# Patient Record
Sex: FEMALE | Race: BLACK OR AFRICAN AMERICAN | Employment: OTHER | URBAN - METROPOLITAN AREA
[De-identification: names, ages, dates, MRNs, and addresses within clinical notes are randomized per-mention and may not be internally consistent; named-entity substitution may affect disease eponyms.]

---

## 2017-05-09 ENCOUNTER — HOSPITAL ENCOUNTER (OUTPATIENT)
Dept: MAMMOGRAPHY | Age: 62
Discharge: HOME | End: 2017-05-09
Attending: OBSTETRICS & GYNECOLOGY
Payer: COMMERCIAL

## 2017-05-09 DIAGNOSIS — Z85.3 PERSONAL HISTORY OF MALIGNANT NEOPLASM OF BREAST: ICD-10-CM

## 2017-05-09 PROCEDURE — 76642 ULTRASOUND BREAST LIMITED: CPT

## 2017-05-09 PROCEDURE — 77065 DX MAMMO INCL CAD UNI: CPT

## 2017-08-09 ENCOUNTER — HOSPITAL ENCOUNTER (OUTPATIENT)
Dept: MAMMOGRAPHY | Age: 62
Discharge: HOME | End: 2017-08-09
Attending: OBSTETRICS & GYNECOLOGY
Payer: COMMERCIAL

## 2017-08-09 DIAGNOSIS — Z12.31 VISIT FOR SCREENING MAMMOGRAM: ICD-10-CM

## 2017-08-09 PROCEDURE — 77067 SCR MAMMO BI INCL CAD: CPT

## 2018-08-11 ENCOUNTER — HOSPITAL ENCOUNTER (OUTPATIENT)
Dept: MAMMOGRAPHY | Age: 63
Discharge: HOME | End: 2018-08-11
Attending: OBSTETRICS & GYNECOLOGY
Payer: COMMERCIAL

## 2018-08-11 DIAGNOSIS — Z12.31 VISIT FOR SCREENING MAMMOGRAM: ICD-10-CM

## 2018-08-11 PROCEDURE — 77063 BREAST TOMOSYNTHESIS BI: CPT

## 2019-02-06 ENCOUNTER — HOSPITAL ENCOUNTER (OUTPATIENT)
Dept: MRI IMAGING | Age: 64
Discharge: HOME | End: 2019-02-06
Attending: PHYSICAL MEDICINE & REHABILITATION
Payer: COMMERCIAL

## 2019-02-06 ENCOUNTER — HOSPITAL ENCOUNTER (OUTPATIENT)
Dept: GENERAL RADIOLOGY | Age: 64
Discharge: HOME | End: 2019-02-06
Attending: PHYSICAL MEDICINE & REHABILITATION
Payer: COMMERCIAL

## 2019-02-06 DIAGNOSIS — M54.17 LUMBOSACRAL NEURITIS: ICD-10-CM

## 2019-02-06 DIAGNOSIS — M54.17 LUMBOSACRAL RADICULOPATHY: ICD-10-CM

## 2019-02-06 PROCEDURE — 72148 MRI LUMBAR SPINE W/O DYE: CPT

## 2019-02-06 PROCEDURE — 72110 X-RAY EXAM L-2 SPINE 4/>VWS: CPT

## 2019-02-11 ENCOUNTER — HOSPITAL ENCOUNTER (INPATIENT)
Age: 64
LOS: 14 days | Discharge: HOME HEALTH | DRG: 477 | End: 2019-02-25
Attending: EMERGENCY MEDICINE | Admitting: INTERNAL MEDICINE
Payer: COMMERCIAL

## 2019-02-11 DIAGNOSIS — G06.2 EPIDURAL ABSCESS: ICD-10-CM

## 2019-02-11 DIAGNOSIS — M86.08 ACUTE HEMATOGENOUS OSTEOMYELITIS OF OTHER SITE (HCC): Primary | ICD-10-CM

## 2019-02-11 LAB
ALBUMIN SERPL-MCNC: 3.8 G/DL (ref 3.5–5)
ALBUMIN/GLOB SERPL: 0.8 {RATIO} (ref 1.1–2.2)
ALP SERPL-CCNC: 150 U/L (ref 45–117)
ALT SERPL-CCNC: 29 U/L (ref 12–78)
ANION GAP SERPL CALC-SCNC: 6 MMOL/L (ref 5–15)
AST SERPL-CCNC: 30 U/L (ref 15–37)
BASOPHILS # BLD: 0 K/UL (ref 0–0.1)
BASOPHILS NFR BLD: 0 % (ref 0–1)
BILIRUB SERPL-MCNC: 0.3 MG/DL (ref 0.2–1)
BUN SERPL-MCNC: 17 MG/DL (ref 6–20)
BUN/CREAT SERPL: 20 (ref 12–20)
CALCIUM SERPL-MCNC: 8.7 MG/DL (ref 8.5–10.1)
CHLORIDE SERPL-SCNC: 105 MMOL/L (ref 97–108)
CO2 SERPL-SCNC: 29 MMOL/L (ref 21–32)
COMMENT, HOLDF: NORMAL
CREAT SERPL-MCNC: 0.85 MG/DL (ref 0.55–1.02)
DIFFERENTIAL METHOD BLD: ABNORMAL
EOSINOPHIL # BLD: 0 K/UL (ref 0–0.4)
EOSINOPHIL NFR BLD: 1 % (ref 0–7)
ERYTHROCYTE [DISTWIDTH] IN BLOOD BY AUTOMATED COUNT: 14.6 % (ref 11.5–14.5)
GLOBULIN SER CALC-MCNC: 5 G/DL (ref 2–4)
GLUCOSE SERPL-MCNC: 103 MG/DL (ref 65–100)
HCT VFR BLD AUTO: 36.5 % (ref 35–47)
HGB BLD-MCNC: 12 G/DL (ref 11.5–16)
IMM GRANULOCYTES # BLD AUTO: 0 K/UL (ref 0–0.04)
IMM GRANULOCYTES NFR BLD AUTO: 0 % (ref 0–0.5)
LYMPHOCYTES # BLD: 2.2 K/UL (ref 0.8–3.5)
LYMPHOCYTES NFR BLD: 35 % (ref 12–49)
MCH RBC QN AUTO: 29.9 PG (ref 26–34)
MCHC RBC AUTO-ENTMCNC: 32.9 G/DL (ref 30–36.5)
MCV RBC AUTO: 90.8 FL (ref 80–99)
MONOCYTES # BLD: 0.5 K/UL (ref 0–1)
MONOCYTES NFR BLD: 9 % (ref 5–13)
NEUTS SEG # BLD: 3.4 K/UL (ref 1.8–8)
NEUTS SEG NFR BLD: 55 % (ref 32–75)
NRBC # BLD: 0 K/UL (ref 0–0.01)
NRBC BLD-RTO: 0 PER 100 WBC
PLATELET # BLD AUTO: 204 K/UL (ref 150–400)
PMV BLD AUTO: 10.2 FL (ref 8.9–12.9)
POTASSIUM SERPL-SCNC: 3.3 MMOL/L (ref 3.5–5.1)
PROT SERPL-MCNC: 8.8 G/DL (ref 6.4–8.2)
RBC # BLD AUTO: 4.02 M/UL (ref 3.8–5.2)
SAMPLES BEING HELD,HOLD: NORMAL
SODIUM SERPL-SCNC: 140 MMOL/L (ref 136–145)
WBC # BLD AUTO: 6.1 K/UL (ref 3.6–11)

## 2019-02-11 PROCEDURE — 0QB03ZX EXCISION OF LUMBAR VERTEBRA, PERCUTANEOUS APPROACH, DIAGNOSTIC: ICD-10-PCS | Performed by: RADIOLOGY

## 2019-02-11 PROCEDURE — 87040 BLOOD CULTURE FOR BACTERIA: CPT

## 2019-02-11 PROCEDURE — 85025 COMPLETE CBC W/AUTO DIFF WBC: CPT

## 2019-02-11 PROCEDURE — 74011250637 HC RX REV CODE- 250/637: Performed by: INTERNAL MEDICINE

## 2019-02-11 PROCEDURE — 80053 COMPREHEN METABOLIC PANEL: CPT

## 2019-02-11 PROCEDURE — 74011000258 HC RX REV CODE- 258: Performed by: INTERNAL MEDICINE

## 2019-02-11 PROCEDURE — 99284 EMERGENCY DEPT VISIT MOD MDM: CPT

## 2019-02-11 PROCEDURE — 65270000032 HC RM SEMIPRIVATE

## 2019-02-11 PROCEDURE — 36415 COLL VENOUS BLD VENIPUNCTURE: CPT

## 2019-02-11 PROCEDURE — 74011250636 HC RX REV CODE- 250/636: Performed by: INTERNAL MEDICINE

## 2019-02-11 RX ORDER — LANOLIN ALCOHOL/MO/W.PET/CERES
1000 CREAM (GRAM) TOPICAL DAILY
COMMUNITY

## 2019-02-11 RX ORDER — FERROUS SULFATE, DRIED 160(50) MG
1 TABLET, EXTENDED RELEASE ORAL DAILY
Status: DISCONTINUED | OUTPATIENT
Start: 2019-02-12 | End: 2019-02-25 | Stop reason: HOSPADM

## 2019-02-11 RX ORDER — SODIUM CHLORIDE 9 MG/ML
75 INJECTION, SOLUTION INTRAVENOUS CONTINUOUS
Status: DISCONTINUED | OUTPATIENT
Start: 2019-02-11 | End: 2019-02-12

## 2019-02-11 RX ORDER — THERA TABS 400 MCG
1 TAB ORAL DAILY
COMMUNITY

## 2019-02-11 RX ORDER — ACETAMINOPHEN 500 MG
1000 TABLET ORAL
COMMUNITY

## 2019-02-11 RX ORDER — POTASSIUM CHLORIDE 750 MG/1
10 TABLET, FILM COATED, EXTENDED RELEASE ORAL DAILY
Status: DISCONTINUED | OUTPATIENT
Start: 2019-02-12 | End: 2019-02-24

## 2019-02-11 RX ORDER — THERA TABS 400 MCG
1 TAB ORAL DAILY
Status: DISCONTINUED | OUTPATIENT
Start: 2019-02-12 | End: 2019-02-25 | Stop reason: HOSPADM

## 2019-02-11 RX ORDER — CYCLOBENZAPRINE HCL 10 MG
5 TABLET ORAL
COMMUNITY

## 2019-02-11 RX ORDER — ACETAMINOPHEN 500 MG
1000 TABLET ORAL
Status: DISCONTINUED | OUTPATIENT
Start: 2019-02-11 | End: 2019-02-25 | Stop reason: HOSPADM

## 2019-02-11 RX ORDER — SODIUM CHLORIDE 0.9 % (FLUSH) 0.9 %
5-40 SYRINGE (ML) INJECTION AS NEEDED
Status: DISCONTINUED | OUTPATIENT
Start: 2019-02-11 | End: 2019-02-25 | Stop reason: HOSPADM

## 2019-02-11 RX ORDER — ATORVASTATIN CALCIUM 10 MG/1
10 TABLET, FILM COATED ORAL EVERY EVENING
Status: DISCONTINUED | OUTPATIENT
Start: 2019-02-11 | End: 2019-02-25 | Stop reason: HOSPADM

## 2019-02-11 RX ORDER — VENLAFAXINE HYDROCHLORIDE 37.5 MG/1
150 CAPSULE, EXTENDED RELEASE ORAL EVERY EVENING
Status: DISCONTINUED | OUTPATIENT
Start: 2019-02-11 | End: 2019-02-25 | Stop reason: HOSPADM

## 2019-02-11 RX ORDER — LANOLIN ALCOHOL/MO/W.PET/CERES
1000 CREAM (GRAM) TOPICAL DAILY
Status: DISCONTINUED | OUTPATIENT
Start: 2019-02-12 | End: 2019-02-25 | Stop reason: HOSPADM

## 2019-02-11 RX ORDER — MORPHINE SULFATE 2 MG/ML
2 INJECTION, SOLUTION INTRAMUSCULAR; INTRAVENOUS
Status: DISCONTINUED | OUTPATIENT
Start: 2019-02-11 | End: 2019-02-25 | Stop reason: HOSPADM

## 2019-02-11 RX ORDER — HYDROCODONE BITARTRATE AND ACETAMINOPHEN 5; 325 MG/1; MG/1
1 TABLET ORAL
Status: DISCONTINUED | OUTPATIENT
Start: 2019-02-11 | End: 2019-02-25 | Stop reason: HOSPADM

## 2019-02-11 RX ORDER — SODIUM CHLORIDE 0.9 % (FLUSH) 0.9 %
5-40 SYRINGE (ML) INJECTION EVERY 8 HOURS
Status: DISCONTINUED | OUTPATIENT
Start: 2019-02-11 | End: 2019-02-25 | Stop reason: HOSPADM

## 2019-02-11 RX ORDER — ONDANSETRON 2 MG/ML
4 INJECTION INTRAMUSCULAR; INTRAVENOUS
Status: DISCONTINUED | OUTPATIENT
Start: 2019-02-11 | End: 2019-02-25 | Stop reason: HOSPADM

## 2019-02-11 RX ORDER — VANCOMYCIN 2 GRAM/500 ML IN 0.9 % SODIUM CHLORIDE INTRAVENOUS
2000 ONCE
Status: COMPLETED | OUTPATIENT
Start: 2019-02-11 | End: 2019-02-11

## 2019-02-11 RX ORDER — CYCLOBENZAPRINE HCL 10 MG
5 TABLET ORAL
Status: DISCONTINUED | OUTPATIENT
Start: 2019-02-12 | End: 2019-02-25 | Stop reason: HOSPADM

## 2019-02-11 RX ORDER — ATORVASTATIN CALCIUM 10 MG/1
10 TABLET, FILM COATED ORAL EVERY EVENING
COMMUNITY

## 2019-02-11 RX ORDER — POTASSIUM CHLORIDE 1.5 G/1.77G
40 POWDER, FOR SOLUTION ORAL
Status: COMPLETED | OUTPATIENT
Start: 2019-02-11 | End: 2019-02-11

## 2019-02-11 RX ADMIN — POTASSIUM CHLORIDE 40 MEQ: 1.5 POWDER, FOR SOLUTION ORAL at 18:22

## 2019-02-11 RX ADMIN — SODIUM CHLORIDE 75 ML/HR: 900 INJECTION, SOLUTION INTRAVENOUS at 19:55

## 2019-02-11 RX ADMIN — PIPERACILLIN SODIUM,TAZOBACTAM SODIUM 3.38 G: 3; .375 INJECTION, POWDER, FOR SOLUTION INTRAVENOUS at 19:55

## 2019-02-11 RX ADMIN — Medication 10 ML: at 22:00

## 2019-02-11 RX ADMIN — VENLAFAXINE HYDROCHLORIDE 150 MG: 150 CAPSULE, EXTENDED RELEASE ORAL at 22:51

## 2019-02-11 RX ADMIN — VANCOMYCIN HYDROCHLORIDE 2000 MG: 10 INJECTION, POWDER, LYOPHILIZED, FOR SOLUTION INTRAVENOUS at 20:34

## 2019-02-11 RX ADMIN — ATORVASTATIN CALCIUM 10 MG: 20 TABLET, FILM COATED ORAL at 22:51

## 2019-02-11 NOTE — ED PROVIDER NOTES
58 y.o. female with past medical history significant for HTN, hypercholesterolemia, ductal carcinoma right breast s/p lumpectomy x 2 with radiation therapy, who presents ambulatory to the ED, for further evaluation of back pain and osteomyelitis. Pt reports as a referral for admission by Dr. Michael Thomas, Neurosurgery, for osteomyelitis of the back. Pt states that she has been experiencing back pain for ~2.5 months. She states that the pain worsened on Saturday (2/9/19), and she had difficulty \"walking to the car\". Notes that she has difficulty getting up from a seated position and also when \"trying to get started with walking\", but notes that she feels better after initial start. Pt states that she was instructed by Dr. Hernan Lima to take Extra Strength Tylenol x 2 and cyclobenzaprine daily. She denies any h/o back surgery. Pt notes that she was recently given a new exercise with physical therapy for shoulder that is \"painful\" when performed. Denies any open wounds or h/o sepsis or previous osteomyelitis. Pt specifically denies generalized or extremity weakness, numbness, urinary symptoms, of fever. There are no other acute medical concerns at this time. Social hx: Tobacco use (former smoker, quit date: 1/4/2010); Negative for EtOH use; 
PCP: Olga Barahona MD 
Neurosurgeon: Abbe Washington MD 
 
Note written by Flor Sky, as dictated by Anjelica Santana MD 4:26 PM 
 
 
The history is provided by the patient and medical records. No  was used. Past Medical History:  
Diagnosis Date  Breast cancer [facility]) Feb. and March, 2004 Right breast lumpectomy with radiation.  Ductal carcinoma ([facility] [address] right  Ductal carcinoma in situ of breast 2004 Right breast lumpectomy with radiation.  HTN (hypertension)  Hypercholesterolemia  Menopause LMP unknown  S/P radiation therapy 2004 Right breast lumpectomy Past Surgical History: Procedure Laterality Date  HC TRIM SKIN LESION  06  
 skin lesion right breast inframammary fold  HX BREAST BIOPSY  04  
 right; dr. Gertrudis Dangelo  HX BREAST LUMPECTOMY  04  
 right; dr. Gertrudis Dangelo  HX BREAST LUMPECTOMY  3-17-04  
 right Family History:  
Problem Relation Age of Onset  Hypertension Mother  Kidney Disease Mother  Stroke Mother  Cancer Father   
     lymphoma  Cancer Sister   
     lukemia Social History Socioeconomic History  Marital status: SINGLE Spouse name: Not on file  Number of children: Not on file  Years of education: Not on file  Highest education level: Not on file Social Needs  Financial resource strain: Not on file  Food insecurity - worry: Not on file  Food insecurity - inability: Not on file  Transportation needs - medical: Not on file  Transportation needs - non-medical: Not on file Occupational History  Not on file Tobacco Use  Smoking status: Former Smoker Packs/day: 1.00 Years: 30.00 Pack years: 30.00 Last attempt to quit: 2010 Years since quittin.1  Smokeless tobacco: Never Used Substance and Sexual Activity  Alcohol use: No  
 Drug use: Not on file  Sexual activity: Not on file Other Topics Concern  Not on file Social History Narrative  Not on file ALLERGIES: Patient has no known allergies. Review of Systems Constitutional: Negative for activity change, chills and fever. HENT: Negative for nosebleeds, sore throat, trouble swallowing and voice change. Eyes: Negative for visual disturbance. Respiratory: Negative for shortness of breath. Cardiovascular: Negative for chest pain and palpitations. Gastrointestinal: Negative for abdominal pain, constipation, diarrhea and nausea. Genitourinary: Negative for difficulty urinating, dysuria, hematuria and urgency. Musculoskeletal: Positive for back pain. Negative for neck pain and neck stiffness. Skin: Negative for color change and wound. Allergic/Immunologic: Negative for immunocompromised state. Neurological: Negative for dizziness, seizures, syncope, weakness, light-headedness, numbness and headaches. Psychiatric/Behavioral: Negative for behavioral problems, confusion, hallucinations, self-injury and suicidal ideas. Vitals:  
 02/11/19 1359 02/11/19 1436 02/11/19 1630 BP:  (!) 162/95 156/89 Pulse: (!) 110 (!) 103 Resp:  20 Temp:  98.5 °F (36.9 °C) SpO2: 98% 100% 99% Weight:  79.4 kg (175 lb) Height:  5' 4.5\" (1.638 m) Physical Exam  
Constitutional: She is oriented to person, place, and time. She appears well-developed and well-nourished. No distress. HENT:  
Head: Normocephalic and atraumatic. Eyes: Pupils are equal, round, and reactive to light. Neck: Normal range of motion. Neck supple. Cardiovascular: Normal rate, regular rhythm and normal heart sounds. Exam reveals no gallop and no friction rub. No murmur heard. Pulmonary/Chest: Effort normal and breath sounds normal. No respiratory distress. She has no wheezes. Abdominal: Soft. Bowel sounds are normal. She exhibits no distension. There is no tenderness. There is no rebound and no guarding. Musculoskeletal: Normal range of motion. Neurological: She is alert and oriented to person, place, and time. Skin: Skin is warm. No rash noted. She is not diaphoretic. Psychiatric: She has a normal mood and affect. Her behavior is normal. Judgment and thought content normal.  
Nursing note and vitals reviewed. Note written by Flor Puentes, as dictated by Ranulfo Nunez MD 4:33 PM 
 
MDM This is a 59-year-old female with past medical history, review of systems, physical exam as above, presenting with complaints of back pain, in the context of recent MRI concerning for lumbar osteo-myelitis. Patient states several months of atraumatic back pain, received MRI as part of her workup, denies fevers, taking Tylenol for pain, denies lower extremity weakness, paresthesias, urinary or bowel incontinence, denies saddle anesthesia. Exam unremarkable for well-appearing elderly female, in no acute distress, nonfocal neurologic exam, clear breath sounds, midline lumbar tenderness to palpation, without deformity. Her review indicates MRI consistent with osteomyelitis. Plan to consult with neurosurgery, and she was referred here by Dr. Kelly Conte, will consult the hospitalist for admission. Procedures CONSULT NOTE: 
4:59 PM Ramona Avery MD spoke with Dr. Porsche Cross MD, Consult for Neurosurgery. Discussed available diagnostic tests and clinical findings. Dr. Lamonte Bumpers recommends admitting patient to the hospitalist. Will defer antibiotic to hospitalist workup. Hospitalist YohanneserText for Admission 5:03 PM 
 
ED Room Number: VI47/62 Patient Name and age: Sony Quinones 58 y.o.  female Working Diagnosis: 1. Acute hematogenous osteomyelitis of other site [facility]) Readmission: no 
Isolation Requirements:  no 
Recommended Level of Care:  med/surg Code Status:  Full Code Other:  Discussed with Dr. Gabby Manzo CONSULT NOTE: 
5:09 PM Ramona Avery MD spoke with Dr. Tabitha Briggs MD, Consult for Hospitalist.  Discussed available diagnostic tests and clinical findings. Dr. Gabby Manzo will evaluate the patient for admission to the hospital. 
 
5:09 PM 
Patient is being admitted to the hospital.  The results of their tests and reasons for their admission have been discussed with them and/or available family. They convey agreement and understanding for the need to be admitted and for their admission diagnosis. Consultation will be made now with the inpatient physician for hospitalization.

## 2019-02-11 NOTE — PROGRESS NOTES
Admission Medication Reconciliation: 
 
Information obtained from: Patient, Alfie Flores Significant PMH/Disease States:  
Past Medical History:  
Diagnosis Date  Breast cancer [facility]) Feb. and March, 2004 Right breast lumpectomy with radiation.  Ductal carcinoma ([address] right  Ductal carcinoma in situ of breast 2004 Right breast lumpectomy with radiation.  HTN (hypertension)  Hypercholesterolemia  Menopause LMP unknown  S/P radiation therapy 2004 Right breast lumpectomy Chief Complaint for this Admission:  Referral / consult Allergies:  Patient has no known allergies. Prior to Admission Medications:  
Prior to Admission Medications Prescriptions Last Dose Informant Patient Reported? Taking? CALCIUM CARBONATE/VITAMIN D3 (CALTRATE 600 + D PO) 2/10/2019 at Unknown time  Yes Yes Sig: Take 1 Tab by mouth daily. acetaminophen (MAPAP EXTRA STRENGTH) 500 mg tablet 2/11/2019 at Unknown time  Yes Yes Sig: Take 1,000 mg by mouth every six (6) hours as needed for Pain. atorvastatin (LIPITOR) 10 mg tablet 2/10/2019 at Unknown time  Yes Yes Sig: Take 10 mg by mouth every evening. cyanocobalamin 1,000 mcg tablet 2/11/2019 at Unknown time  Yes Yes Sig: Take 1,000 mcg by mouth daily. cyclobenzaprine (FLEXERIL) 10 mg tablet 2/11/2019 at Unknown time  Yes Yes Sig: Take 5 mg by mouth three (3) times daily (with meals). doxycycline hyclate (VIBRAMYCIN) 20 mg Cap capsule   Yes No  
Sig: Take 20 mg by mouth. 2 tabs   
hydrochlorothiazide (HYDRODIURIL) 25 mg tablet 2/11/2019 at Unknown time  Yes Yes Sig: Take 25 mg by mouth daily. potassium chloride SR (KLOR-CON 10) 10 mEq tablet 2/11/2019 at Unknown time  Yes Yes Sig: Take 10 mEq by mouth daily. therapeutic multivitamin (THERAGRAN) tablet 2/11/2019 at Unknown time  Yes Yes Sig: Take 1 Tab by mouth daily. venlafaxine-SR (EFFEXOR XR) 150 mg capsule 2/10/2019 at Unknown time  Yes Yes Sig: Take 150 mg by mouth every evening. Facility-Administered Medications: None Comments/Recommendations: Patient provided information. Uses [facility] [address] in [address]. Note: 1. Doxycycline: chronic medication, treating gum disease, followed closely by dentist  
 
Added: 1. Cyanocobalamin 2. APAP 3. Cyclobenzaprine Revised: 1. Calcium carbonate 2. Doxycycline 3. Atorvastatin 4. Potassium 5. Venlafaxine Thank you for allowing me to participate in the care of your patient. Shannan De LeónD, RN #[id number]

## 2019-02-11 NOTE — ED TRIAGE NOTES
Triage Note: Patient is being sent in by Dr. Tammy Flores for osteomyelitis to the back.  Patient is to be admitted to the hospital.

## 2019-02-12 ENCOUNTER — APPOINTMENT (OUTPATIENT)
Dept: MRI IMAGING | Age: 64
DRG: 477 | End: 2019-02-12
Attending: NURSE PRACTITIONER
Payer: COMMERCIAL

## 2019-02-12 LAB
ANION GAP SERPL CALC-SCNC: 7 MMOL/L (ref 5–15)
BUN SERPL-MCNC: 13 MG/DL (ref 6–20)
BUN/CREAT SERPL: 15 (ref 12–20)
CALCIUM SERPL-MCNC: 8 MG/DL (ref 8.5–10.1)
CHLORIDE SERPL-SCNC: 109 MMOL/L (ref 97–108)
CO2 SERPL-SCNC: 25 MMOL/L (ref 21–32)
CREAT SERPL-MCNC: 0.84 MG/DL (ref 0.55–1.02)
ERYTHROCYTE [DISTWIDTH] IN BLOOD BY AUTOMATED COUNT: 14.6 % (ref 11.5–14.5)
GLUCOSE SERPL-MCNC: 120 MG/DL (ref 65–100)
HCT VFR BLD AUTO: 31.5 % (ref 35–47)
HGB BLD-MCNC: 10.4 G/DL (ref 11.5–16)
MCH RBC QN AUTO: 29.8 PG (ref 26–34)
MCHC RBC AUTO-ENTMCNC: 33 G/DL (ref 30–36.5)
MCV RBC AUTO: 90.3 FL (ref 80–99)
NRBC # BLD: 0 K/UL (ref 0–0.01)
NRBC BLD-RTO: 0 PER 100 WBC
PLATELET # BLD AUTO: 177 K/UL (ref 150–400)
PMV BLD AUTO: 10 FL (ref 8.9–12.9)
POTASSIUM SERPL-SCNC: 3.8 MMOL/L (ref 3.5–5.1)
RBC # BLD AUTO: 3.49 M/UL (ref 3.8–5.2)
SODIUM SERPL-SCNC: 141 MMOL/L (ref 136–145)
WBC # BLD AUTO: 6.3 K/UL (ref 3.6–11)

## 2019-02-12 PROCEDURE — 74011250637 HC RX REV CODE- 250/637: Performed by: INTERNAL MEDICINE

## 2019-02-12 PROCEDURE — 65270000032 HC RM SEMIPRIVATE

## 2019-02-12 PROCEDURE — A9575 INJ GADOTERATE MEGLUMI 0.1ML: HCPCS | Performed by: INTERNAL MEDICINE

## 2019-02-12 PROCEDURE — 85027 COMPLETE CBC AUTOMATED: CPT

## 2019-02-12 PROCEDURE — 85652 RBC SED RATE AUTOMATED: CPT

## 2019-02-12 PROCEDURE — 36415 COLL VENOUS BLD VENIPUNCTURE: CPT

## 2019-02-12 PROCEDURE — 80048 BASIC METABOLIC PNL TOTAL CA: CPT

## 2019-02-12 PROCEDURE — 74011636320 HC RX REV CODE- 636/320: Performed by: INTERNAL MEDICINE

## 2019-02-12 PROCEDURE — 74011250636 HC RX REV CODE- 250/636: Performed by: INTERNAL MEDICINE

## 2019-02-12 PROCEDURE — 74011000258 HC RX REV CODE- 258: Performed by: INTERNAL MEDICINE

## 2019-02-12 PROCEDURE — 72158 MRI LUMBAR SPINE W/O & W/DYE: CPT

## 2019-02-12 RX ADMIN — CYCLOBENZAPRINE 5 MG: 10 TABLET, FILM COATED ORAL at 11:05

## 2019-02-12 RX ADMIN — CYCLOBENZAPRINE 5 MG: 10 TABLET, FILM COATED ORAL at 17:57

## 2019-02-12 RX ADMIN — Medication 20 ML: at 04:12

## 2019-02-12 RX ADMIN — OYSTER SHELL CALCIUM WITH VITAMIN D 1 TABLET: 500; 200 TABLET, FILM COATED ORAL at 09:31

## 2019-02-12 RX ADMIN — PIPERACILLIN SODIUM,TAZOBACTAM SODIUM 3.38 G: 3; .375 INJECTION, POWDER, FOR SOLUTION INTRAVENOUS at 19:12

## 2019-02-12 RX ADMIN — VANCOMYCIN HYDROCHLORIDE 1000 MG: 1 INJECTION, POWDER, LYOPHILIZED, FOR SOLUTION INTRAVENOUS at 19:12

## 2019-02-12 RX ADMIN — THERA TABS 1 TABLET: TAB at 09:31

## 2019-02-12 RX ADMIN — POTASSIUM CHLORIDE 10 MEQ: 750 TABLET, FILM COATED, EXTENDED RELEASE ORAL at 09:31

## 2019-02-12 RX ADMIN — PIPERACILLIN SODIUM,TAZOBACTAM SODIUM 3.38 G: 3; .375 INJECTION, POWDER, FOR SOLUTION INTRAVENOUS at 11:05

## 2019-02-12 RX ADMIN — CYANOCOBALAMIN TAB 500 MCG 1000 MCG: 500 TAB at 09:34

## 2019-02-12 RX ADMIN — GADOTERATE MEGLUMINE 16 ML: 376.9 INJECTION INTRAVENOUS at 18:00

## 2019-02-12 RX ADMIN — CYCLOBENZAPRINE 5 MG: 10 TABLET, FILM COATED ORAL at 07:12

## 2019-02-12 RX ADMIN — Medication 10 ML: at 15:49

## 2019-02-12 RX ADMIN — ATORVASTATIN CALCIUM 10 MG: 20 TABLET, FILM COATED ORAL at 17:57

## 2019-02-12 RX ADMIN — PIPERACILLIN SODIUM,TAZOBACTAM SODIUM 3.38 G: 3; .375 INJECTION, POWDER, FOR SOLUTION INTRAVENOUS at 04:11

## 2019-02-12 RX ADMIN — VANCOMYCIN HYDROCHLORIDE 1000 MG: 1 INJECTION, POWDER, LYOPHILIZED, FOR SOLUTION INTRAVENOUS at 07:13

## 2019-02-12 RX ADMIN — VENLAFAXINE HYDROCHLORIDE 150 MG: 150 CAPSULE, EXTENDED RELEASE ORAL at 17:57

## 2019-02-12 NOTE — PROGRESS NOTES
Hospitalist Progress Note Philippe Caban MD 
Answering service: [phone] OR [phone] from in house phone Date of Service:  2019 NAME:  Rosmery Robertson :  1955 MRN:  [id number] Admission Summary:  
Rosmery Robertson is a 58 y.o. female with PMH of breast ca s/p lumpectomy and radiation, htn, hld who presents with back pain. Has been having symptoms for the past 2 weeks. Met with PCP and underwent outpt MRI last Thursday found remarkable for L5-S1 vertebral osteomyelitis with epidural abscess and severe bilateral canal foraminal stenosis. Interval history / Subjective:  
 Back pain is tolerable today, as she is largely not moving around. Has been up to the bathroom. Tolerating PO well. No fevers, chills, no urinary incontinence, no weakness, no numbness. Assessment & Plan: Suspected L5-S1 osteomyelitis with possible epidural abscess - Repeat MRI with contrast per NSGY 
- NSGY following, for possible OR debridement - On empiric Vanc/Zosyn, f/u blood cultures - Pharmacy to assist with Vanc dosing - Flexeril  
- Continue serial physical exams  
- PO norco for pain, IV morphine for breathrough Anemia - ?lab error - Repeat tomorrow, possible that was dilutional 
- If remains low, will pursue further workup. HLD - home statin H/o breast cancer DCIS Menopause - home effexor Code status: FULL 
DVT prophylaxis: SCD Care Plan discussed with: Patient/Family Disposition: Home w/Family, patient ambulatory on admission. Hospital Problems  Date Reviewed: 2014 Codes Class Noted POA Epidural abscess ICD-10-CM: G06.2 ICD-9-CM: 324.9  2019 Unknown Review of Systems: A comprehensive review of systems was negative except for that written in the HPI. Vital Signs:  
 Last 24hrs VS reviewed since prior progress note. Most recent are: 
Visit Vitals BP (!) 149/95 (BP 1 Location: Right arm, BP Patient Position: At rest) Pulse 86 Temp 98.7 °F (37.1 °C) Resp 18 Ht 5' 4.5\" (1.638 m) Wt 79.4 kg (175 lb) SpO2 98% BMI 29.57 kg/m² No intake or output data in the 24 hours ending 02/12/19 1102 Physical Examination:  
 
 
     
Constitutional:  No acute distress, cooperative, pleasant   
ENT:  Oral mucous moist, oropharynx benign. Resp:  CTA bilaterally. No wheezing/rhonchi/rales. No accessory muscle use CV:  Regular rhythm, normal rate, no murmurs, gallops, rubs GI:  Soft, non distended, non tender. normoactive bowel sounds, no hepatosplenomegaly Musculoskeletal:  No edema, warm, 2+ pulses throughout Neurologic:  Moves all extremities. AAOx3, CN II-XII reviewed Skin:  Good turgor, no rashes or ulcers Data Review: All imaging and laboratory data reviewed. Labs:  
 
Recent Labs  
  02/12/19 
0212 02/11/19 
1455 WBC 6.3 6.1 HGB 10.4* 12.0 HCT 31.5* 36.5  204 Recent Labs  
  02/12/19 
0212 02/11/19 
1455  140  
K 3.8 3.3*  
* 105 CO2 25 29 BUN 13 17 CREA 0.84 0.85 * 103* CA 8.0* 8.7 Recent Labs  
  02/11/19 
1455 SGOT 30 ALT 29 * TBILI 0.3 TP 8.8* ALB 3.8 GLOB 5.0* No results for input(s): INR, PTP, APTT in the last 72 hours. No lab exists for component: INREXT No results for input(s): FE, TIBC, PSAT, FERR in the last 72 hours. No results found for: FOL, RBCF No results for input(s): PH, PCO2, PO2 in the last 72 hours. No results for input(s): CPK, CKNDX, TROIQ in the last 72 hours. No lab exists for component: CPKMB No results found for: CHOL, CHOLX, CHLST, CHOLV, HDL, LDL, LDLC, DLDLP, TGLX, TRIGL, TRIGP, CHHD, CHHDX No results found for: Lemon Lie No results found for: COLOR, APPRN, SPGRU, REFSG, ALEXA, PROTU, GLUCU, KETU, BILU, UROU, RANDY, LEUKU, GLUKE, EPSU, BACTU, WBCU, RBCU, CASTS, UCRY Medications Reviewed: Current Facility-Administered Medications Medication Dose Route Frequency  venlafaxine-SR (EFFEXOR-XR) capsule 150 mg  150 mg Oral QPM  
 calcium-vitamin D (OS-MICHAEL) 500 mg-200 unit tablet  1 Tab Oral DAILY  potassium chloride SR (KLOR-CON 10) tablet 10 mEq  10 mEq Oral DAILY  atorvastatin (LIPITOR) tablet 10 mg  10 mg Oral QPM  
 therapeutic multivitamin (THERAGRAN) tablet 1 Tab  1 Tab Oral DAILY  cyclobenzaprine (FLEXERIL) tablet 5 mg  5 mg Oral TID WITH MEALS  
 acetaminophen (TYLENOL) tablet 1,000 mg  1,000 mg Oral Q6H PRN  
 cyanocobalamin (VITAMIN B12) tablet 1,000 mcg  1,000 mcg Oral DAILY  sodium chloride (NS) flush 5-40 mL  5-40 mL IntraVENous Q8H  
 sodium chloride (NS) flush 5-40 mL  5-40 mL IntraVENous PRN  
 HYDROcodone-acetaminophen (NORCO) 5-325 mg per tablet 1 Tab  1 Tab Oral Q4H PRN  
 morphine injection 2 mg  2 mg IntraVENous Q4H PRN  
 ondansetron (ZOFRAN) injection 4 mg  4 mg IntraVENous Q4H PRN  piperacillin-tazobactam (ZOSYN) 3.375 g in 0.9% sodium chloride (MBP/ADV) 100 mL  3.375 g IntraVENous Q8H  
 vancomycin (VANCOCIN) 1,000 mg in 0.9% sodium chloride (MBP/ADV) 250 mL  1,000 mg IntraVENous Q12H  Vancomycin - pharmacy to dose   Other Rx Dosing/Monitoring  
 
______________________________________________________________________ EXPECTED LENGTH OF STAY: 4d 9h 
ACTUAL LENGTH OF STAY:          1 
 
            
Tiffany Vila MD

## 2019-02-12 NOTE — PROGRESS NOTES
Pharmacist Note - Vancomycin Dosing Consult provided for this 58 y.o. female for indication of L5-S1 vertebral osteomyelitis with epidural abscess. Antibiotic regimen(s): Vanc + Zosyn Recent Labs  
  19 
1455 WBC 6.1 CREA 0.85 BUN 17 Frequency of BMP: daily x 3 Height: 163.8 cm Weight: 79.4 kg Est CrCl: 69 ml/min Temp (24hrs), Av.5 °F (36.9 °C), Min:98.5 °F (36.9 °C), Max:98.5 °F (36.9 °C) Cultures: 
 blood - pending Goal trough = 15 - 20 mcg/mL Therapy will be initiated with a loading dose of 2000 mg IV x 1 to be followed by a maintenance dose of 1000 mg IV every 12 hours. Pharmacy to follow patient daily and order levels / make dose adjustments as appropriate.

## 2019-02-12 NOTE — ROUTINE PROCESS
TRANSFER - OUT REPORT: 
 
Verbal report given to HERNANDEZ Rader(name) on Ritesh Zazueta  being transferred to 60(unit) for routine progression of care Report consisted of patients Situation, Background, Assessment and  
Recommendations(SBAR). Information from the following report(s) SBAR was reviewed with the receiving nurse. Lines:  
Peripheral IV 02/11/19 Left Antecubital (Active) Site Assessment Clean, dry, & intact 2/11/2019  2:56 PM  
Phlebitis Assessment 0 2/11/2019  2:56 PM  
Infiltration Assessment 0 2/11/2019  2:56 PM  
Dressing Status Clean, dry, & intact 2/11/2019  2:56 PM  
Dressing Type Tape;Transparent 2/11/2019  2:56 PM  
Hub Color/Line Status Pink;Capped;Flushed;Patent 2/11/2019  2:56 PM  
Action Taken Blood drawn 2/11/2019  2:56 PM  
  
 
Opportunity for questions and clarification was provided.

## 2019-02-12 NOTE — PROGRESS NOTES
Problem: Falls - Risk of 
Goal: *Absence of Falls Document Tammy Webster Fall Risk and appropriate interventions in the flowsheet. Outcome: Progressing Towards Goal 
Fall Risk Interventions: 
Mobility Interventions: Patient to call before getting OOB, Utilize walker, cane, or other assistive device

## 2019-02-12 NOTE — CONSULTS
Full consult to follow. Briefly, 55yo seen by Dr. Nikko De La Cruz as outpatient with non-contrast MRI suggesting osteomyelitis. Recommend MRI of lumbar spine with contrast to evaluate extent of osteomyelitis and question of epidural abscess. Will follow with you. Thank you for this consultation.

## 2019-02-12 NOTE — ED NOTES
Spoke with Dr Heather Kapoor regarding patient status, VS stable. After reviewing the chart, received verbal order for transfer to medical bed.

## 2019-02-13 LAB
ANION GAP SERPL CALC-SCNC: 8 MMOL/L (ref 5–15)
BASOPHILS # BLD: 0 K/UL (ref 0–0.1)
BASOPHILS NFR BLD: 1 % (ref 0–1)
BUN SERPL-MCNC: 14 MG/DL (ref 6–20)
BUN/CREAT SERPL: 15 (ref 12–20)
CALCIUM SERPL-MCNC: 8.6 MG/DL (ref 8.5–10.1)
CHLORIDE SERPL-SCNC: 106 MMOL/L (ref 97–108)
CO2 SERPL-SCNC: 26 MMOL/L (ref 21–32)
CREAT SERPL-MCNC: 0.92 MG/DL (ref 0.55–1.02)
DIFFERENTIAL METHOD BLD: ABNORMAL
EOSINOPHIL # BLD: 0.1 K/UL (ref 0–0.4)
EOSINOPHIL NFR BLD: 2 % (ref 0–7)
ERYTHROCYTE [DISTWIDTH] IN BLOOD BY AUTOMATED COUNT: 14.6 % (ref 11.5–14.5)
ERYTHROCYTE [SEDIMENTATION RATE] IN BLOOD: 41 MM/HR (ref 0–30)
GLUCOSE SERPL-MCNC: 97 MG/DL (ref 65–100)
HCT VFR BLD AUTO: 35.1 % (ref 35–47)
HGB BLD-MCNC: 11.2 G/DL (ref 11.5–16)
IMM GRANULOCYTES # BLD AUTO: 0 K/UL (ref 0–0.04)
IMM GRANULOCYTES NFR BLD AUTO: 1 % (ref 0–0.5)
LYMPHOCYTES # BLD: 2.8 K/UL (ref 0.8–3.5)
LYMPHOCYTES NFR BLD: 46 % (ref 12–49)
MAGNESIUM SERPL-MCNC: 2 MG/DL (ref 1.6–2.4)
MCH RBC QN AUTO: 28.6 PG (ref 26–34)
MCHC RBC AUTO-ENTMCNC: 31.9 G/DL (ref 30–36.5)
MCV RBC AUTO: 89.5 FL (ref 80–99)
MONOCYTES # BLD: 0.6 K/UL (ref 0–1)
MONOCYTES NFR BLD: 9 % (ref 5–13)
NEUTS SEG # BLD: 2.6 K/UL (ref 1.8–8)
NEUTS SEG NFR BLD: 43 % (ref 32–75)
NRBC # BLD: 0 K/UL (ref 0–0.01)
NRBC BLD-RTO: 0 PER 100 WBC
PHOSPHATE SERPL-MCNC: 4.1 MG/DL (ref 2.6–4.7)
PLATELET # BLD AUTO: 183 K/UL (ref 150–400)
PMV BLD AUTO: 9.9 FL (ref 8.9–12.9)
POTASSIUM SERPL-SCNC: 3.5 MMOL/L (ref 3.5–5.1)
RBC # BLD AUTO: 3.92 M/UL (ref 3.8–5.2)
SODIUM SERPL-SCNC: 140 MMOL/L (ref 136–145)
WBC # BLD AUTO: 6.1 K/UL (ref 3.6–11)

## 2019-02-13 PROCEDURE — 65270000032 HC RM SEMIPRIVATE

## 2019-02-13 PROCEDURE — 36415 COLL VENOUS BLD VENIPUNCTURE: CPT

## 2019-02-13 PROCEDURE — 85025 COMPLETE CBC W/AUTO DIFF WBC: CPT

## 2019-02-13 PROCEDURE — 84100 ASSAY OF PHOSPHORUS: CPT

## 2019-02-13 PROCEDURE — 74011000258 HC RX REV CODE- 258: Performed by: INTERNAL MEDICINE

## 2019-02-13 PROCEDURE — 83735 ASSAY OF MAGNESIUM: CPT

## 2019-02-13 PROCEDURE — 74011250637 HC RX REV CODE- 250/637: Performed by: INTERNAL MEDICINE

## 2019-02-13 PROCEDURE — 80048 BASIC METABOLIC PNL TOTAL CA: CPT

## 2019-02-13 PROCEDURE — 74011250636 HC RX REV CODE- 250/636: Performed by: INTERNAL MEDICINE

## 2019-02-13 RX ORDER — HYDROCHLOROTHIAZIDE 25 MG/1
25 TABLET ORAL DAILY
Status: DISCONTINUED | OUTPATIENT
Start: 2019-02-14 | End: 2019-02-15 | Stop reason: SDUPTHER

## 2019-02-13 RX ADMIN — PIPERACILLIN SODIUM,TAZOBACTAM SODIUM 3.38 G: 3; .375 INJECTION, POWDER, FOR SOLUTION INTRAVENOUS at 19:28

## 2019-02-13 RX ADMIN — CYCLOBENZAPRINE 5 MG: 10 TABLET, FILM COATED ORAL at 07:25

## 2019-02-13 RX ADMIN — VENLAFAXINE HYDROCHLORIDE 150 MG: 150 CAPSULE, EXTENDED RELEASE ORAL at 18:19

## 2019-02-13 RX ADMIN — Medication 10 ML: at 23:41

## 2019-02-13 RX ADMIN — PIPERACILLIN SODIUM,TAZOBACTAM SODIUM 3.38 G: 3; .375 INJECTION, POWDER, FOR SOLUTION INTRAVENOUS at 02:41

## 2019-02-13 RX ADMIN — POTASSIUM CHLORIDE 10 MEQ: 750 TABLET, FILM COATED, EXTENDED RELEASE ORAL at 08:22

## 2019-02-13 RX ADMIN — VANCOMYCIN HYDROCHLORIDE 1000 MG: 1 INJECTION, POWDER, LYOPHILIZED, FOR SOLUTION INTRAVENOUS at 19:28

## 2019-02-13 RX ADMIN — VANCOMYCIN HYDROCHLORIDE 1000 MG: 1 INJECTION, POWDER, LYOPHILIZED, FOR SOLUTION INTRAVENOUS at 07:25

## 2019-02-13 RX ADMIN — CYCLOBENZAPRINE 5 MG: 10 TABLET, FILM COATED ORAL at 18:19

## 2019-02-13 RX ADMIN — OYSTER SHELL CALCIUM WITH VITAMIN D 1 TABLET: 500; 200 TABLET, FILM COATED ORAL at 08:22

## 2019-02-13 RX ADMIN — Medication 10 ML: at 06:00

## 2019-02-13 RX ADMIN — THERA TABS 1 TABLET: TAB at 08:22

## 2019-02-13 RX ADMIN — ATORVASTATIN CALCIUM 10 MG: 20 TABLET, FILM COATED ORAL at 18:19

## 2019-02-13 RX ADMIN — PIPERACILLIN SODIUM,TAZOBACTAM SODIUM 3.38 G: 3; .375 INJECTION, POWDER, FOR SOLUTION INTRAVENOUS at 11:48

## 2019-02-13 RX ADMIN — CYCLOBENZAPRINE 5 MG: 10 TABLET, FILM COATED ORAL at 11:51

## 2019-02-13 RX ADMIN — CYANOCOBALAMIN TAB 500 MCG 1000 MCG: 500 TAB at 08:22

## 2019-02-13 NOTE — PROGRESS NOTES
Reviewed MRI. This shows osteomyelitis, discitis and epidural infection at L5/S1. There is only moderate stenosis from the epidural infection at L5/S1. There is more severe stenosis at the L3/4 and L4/5 levels from degenerative disease. My recommendation at this point would be to manage the infection with antibiotics and when that is treated she would benefit from lumbar decompression for the stenosis. I recommend consulting ID for recommendations regarding antibiotics and management of infection.

## 2019-02-13 NOTE — PROGRESS NOTES
Hospitalist Progress Note Josh Steele MD 
Answering service: [phone] OR [phone] from in house phone Date of Service:  2019 NAME:  Eileen Patterson :  1955 MRN:  [id number] Admission Summary:  
Eileen Patterson is a 58 y.o. female with PMH of breast ca s/p lumpectomy and radiation, htn, hld who presents with back pain. Has been having symptoms for the past 2 weeks. Met with PCP and underwent outpt MRI last Thursday found remarkable for L5-S1 vertebral osteomyelitis with epidural abscess and severe bilateral canal foraminal stenosis. Interval history / Subjective:  
Seen for f/u of OM Eating lunch, back pain has been well controlled with oral medications for the most part. MRI results reviewed with patient. Notable for L5-S1 OM with associated epidural abscess. Denies any weakness, numbness, or any incontinence. Assessment & Plan: Suspected L5-S1 osteomyelitis with epidural abscess 
- NSGY following, for possible OR debridement - On empiric Vanc/Zosyn, f/u blood cultures NGTD 
- Pharmacy to assist with Vanc dosing - Flexeril PRN 
- Continue serial physical exams  
- PO norco for pain, IV morphine for breathrough Anemia - ?lab error - Repeat tomorrow, possible that was dilutional 
- If remains low, will pursue further workup. HLD - home statin H/o breast cancer DCIS Menopause - home effexor Code status: FULL 
DVT prophylaxis: SCD Care Plan discussed with: Patient/Family Disposition: Home w/Family, patient ambulatory on admission. Hospital Problems  Date Reviewed: 2019 Codes Class Noted POA Epidural abscess ICD-10-CM: G06.2 ICD-9-CM: 324.9  2019 Unknown Review of Systems: A comprehensive review of systems was negative except for that written in the HPI. Vital Signs: Last 24hrs VS reviewed since prior progress note. Most recent are: 
Visit Vitals BP (!) 154/94 (BP 1 Location: Left arm, BP Patient Position: At rest) Pulse 99 Temp 98.5 °F (36.9 °C) Resp 18 Ht 5' 4.5\" (1.638 m) Wt 79.4 kg (175 lb) SpO2 98% BMI 29.57 kg/m² No intake or output data in the 24 hours ending 02/13/19 1235 Physical Examination:  
 
 
     
Constitutional:  No acute distress, cooperative, pleasant   
ENT:  Oral mucous moist, oropharynx benign. Resp:  CTA bilaterally. No wheezing/rhonchi/rales. No accessory muscle use CV:  Regular rhythm, normal rate, no murmurs, gallops, rubs GI:  Soft, non distended, non tender. normoactive bowel sounds, no hepatosplenomegaly Musculoskeletal:  No edema, warm, 2+ pulses throughout, no spinal tenderness. Neurologic:  Moves all extremities. AAOx3, CN II-XII reviewed Skin:  Good turgor, no rashes or ulcers Data Review: All imaging and laboratory data reviewed. Labs:  
 
Recent Labs  
  02/13/19 
0238 02/12/19 
1915 WBC 6.1 6.3 HGB 11.2* 10.4* HCT 35.1 31.5*  177 Recent Labs  
  02/13/19 
0238 02/12/19 
0212 02/11/19 
1455  141 140  
K 3.5 3.8 3.3*  
 109* 105 CO2 26 25 29 BUN 14 13 17 CREA 0.92 0.84 0.85 GLU 97 120* 103* CA 8.6 8.0* 8.7 MG 2.0  --   --   
PHOS 4.1  --   --   
 
Recent Labs  
  02/11/19 
1455 SGOT 30 ALT 29 * TBILI 0.3 TP 8.8* ALB 3.8 GLOB 5.0* No results for input(s): INR, PTP, APTT in the last 72 hours. No lab exists for component: INREXT, INREXT No results for input(s): FE, TIBC, PSAT, FERR in the last 72 hours. No results found for: FOL, RBCF No results for input(s): PH, PCO2, PO2 in the last 72 hours. No results for input(s): CPK, CKNDX, TROIQ in the last 72 hours. No lab exists for component: CPKMB No results found for: CHOL, CHOLX, CHLST, CHOLV, HDL, LDL, LDLC, DLDLP, TGLX, TRIGL, TRIGP, CHHD, CHHDX No results found for: Berdine Close No results found for: COLOR, APPRN, SPGRU, REFSG, ALEXA, PROTU, GLUCU, KETU, BILU, UROU, RANDY, LEUKU, GLUKE, EPSU, BACTU, WBCU, RBCU, CASTS, UCRY Medications Reviewed:  
 
Current Facility-Administered Medications Medication Dose Route Frequency  [START ON 2/14/2019] Vancomycin trough 2/14 @ 0700   Other ONCE  
 venlafaxine-SR (EFFEXOR-XR) capsule 150 mg  150 mg Oral QPM  
 calcium-vitamin D (OS-MICHAEL) 500 mg-200 unit tablet  1 Tab Oral DAILY  potassium chloride SR (KLOR-CON 10) tablet 10 mEq  10 mEq Oral DAILY  atorvastatin (LIPITOR) tablet 10 mg  10 mg Oral QPM  
 therapeutic multivitamin (THERAGRAN) tablet 1 Tab  1 Tab Oral DAILY  cyclobenzaprine (FLEXERIL) tablet 5 mg  5 mg Oral TID WITH MEALS  
 acetaminophen (TYLENOL) tablet 1,000 mg  1,000 mg Oral Q6H PRN  
 cyanocobalamin (VITAMIN B12) tablet 1,000 mcg  1,000 mcg Oral DAILY  sodium chloride (NS) flush 5-40 mL  5-40 mL IntraVENous Q8H  
 sodium chloride (NS) flush 5-40 mL  5-40 mL IntraVENous PRN  
 HYDROcodone-acetaminophen (NORCO) 5-325 mg per tablet 1 Tab  1 Tab Oral Q4H PRN  
 morphine injection 2 mg  2 mg IntraVENous Q4H PRN  
 ondansetron (ZOFRAN) injection 4 mg  4 mg IntraVENous Q4H PRN  piperacillin-tazobactam (ZOSYN) 3.375 g in 0.9% sodium chloride (MBP/ADV) 100 mL  3.375 g IntraVENous Q8H  
 vancomycin (VANCOCIN) 1,000 mg in 0.9% sodium chloride (MBP/ADV) 250 mL  1,000 mg IntraVENous Q12H  Vancomycin - pharmacy to dose   Other Rx Dosing/Monitoring  
 
______________________________________________________________________ EXPECTED LENGTH OF STAY: 6d 2h 
ACTUAL LENGTH OF STAY:          2 
 
            
Elisa Galvin MD

## 2019-02-13 NOTE — CONSULTS
[address]    Name:  Jasen June  MR#:  [id number]  :  1955  ACCOUNT #:  [de-identified]  DATE OF SERVICE:  2019      NEUROSURGERY CONSULTATION    REASON FOR CONSULTATION:  Lumbar stenosis. HISTORY OF PRESENT ILLNESS:  This is a 76-year-old woman who started having back pain  approximately 2-1/2 months ago. She says she was in physical therapy for her right  shoulder when she injured her back. This was in mid November. Then, when she would  go to physical therapy, they would also do back exercises. She was improving until  around Rishabh when she decided to vacuum the upstairs of her home and she says  since then she has had excruciating back pain. It is predominantly in her back in a  belt-like distribution. It will go down her legs, right greater than left. She  describes it is going down the buttocks and the posterior aspect of her leg. She has  not noticed any focal weakness. No bowel or bladder dysfunction. She says that the  pain is worse when she is going from sitting to standing and moving in bed. Occasionally she will have pain when lying down. She says walking actually improves  the pain. When the pain increased, she went to the emergency room at [facility].  They told her to follow up with her primary care physician. She  followed up with her primary care physician who referred her to Dr. Clint Guy of pain  management. He ordered an MRI of her lumbar spine. The MRI was performed last  Wednesday on 2019. It showed osteomyelitis at the L5-S1 level with a question  of epidural abscess although this was a noncontrast scan. The following day on  , she saw Dr. Adela Myers. He recommended medical management and she went back to  see Dr. Clint Guy. Dr. Adela Myers suggested that this could be managed best as an  inpatient. She came to the emergency room Monday, yesterday.   She says that the pain  was quite severe over the weekend. She denies having any fevers, denies having any  history of infection. No history of IV drug use. Again, she denies any fevers. PAST MEDICAL HISTORY:  1.  Ductal breast carcinoma, right lumpectomy x2, both performed in 2004, treated  with radiation. She has had yearly mammograms with no evidence of recurrence. 2.  Hypertension. 3.  Hypercholesterolemia. PAST SURGICAL HISTORY:  1.  Lumpectomy x2.  2.  Cyst on left middle finger removed x2. FAMILY HISTORY:  Positive for hypertension, kidney disease, stroke, cancer. SOCIAL HISTORY:  She is single. She said she quit smoking in 05/2018. Positive for  alcohol use. Denies any IV drug use. She retired last year. She is a court  . CURRENT MEDICATIONS:  1. Lipitor 10 mg p.o. at bedtime. 2.  Os-Jeffrey 1 tablet daily. 3.  Vitamin B12 1000 mcg daily. 4.  Flexeril 5 mg p.o. t.i.d.  5.  Zosyn 3.375 g IV q.8 hours. 6.  Potassium chloride 10 mEq daily. 7.  Multivitamin daily. 8.  Vancomycin 1000 mg IV q.12 hours. 9.  Effexor  mg at bedtime. REVIEW OF SYSTEMS:  Denies any headaches, vision changes, hearing difficulty, chest  pain, shortness of breath, abdominal pain, urinary dysfunction, numbness, muscle  spasm, or skin eruption. PHYSICAL EXAMINATION:  GENERAL:  This is a well-developed, well-nourished woman, in no apparent distress,  alert and oriented x3. Normal affect. Fluent speech. VITAL SIGNS:  Temperature 98.7, blood pressure 158/93, pulse 89. HEENT:  Conjugate gaze. Symmetric face. NEUROLOGICAL:  She has full strength in bilateral upper extremities, deltoid, biceps,  triceps, wrist extensors, hand intrinsics. Full strength in bilateral lower  extremities, hip flexors, quadriceps, tibialis anterior, gastrocnemius, EHL. Reflexes are symmetric in extremities 2+. Negative Oscar sign. 2+ bilateral  patella. Negative clonus. She has normal shoulder range of motion, normal hip range  of motion.   Sensory is grossly intact. IMAGING STUDIES:  She had an MRI of the lumbar spine performed without contrast on  02/06/2019 again showing suggestion of L5-S1 osteomyelitis. There is epidural  component that causes canal stenosis. It is difficult from the study whether it is  lipomatosis or abscess. There is also disk bulging at L3-4 and L4-5 with a  congenitally narrowed canal.    ASSESSMENT AND PLAN:  This is a 69-year-old woman with a several-month history of  back pain with imaging studies suggestive of osteomyelitis. She does not have any  focal neurologic deficits. We will get MRI with contrast to further evaluate  infection and we will follow with you. Agree with current plan of IV antibiotics.       Manisha Al MD      [id number]  D:  02/12/2019 15:17  T:  02/12/2019 19:41  JOB #:  [id number]

## 2019-02-13 NOTE — PROGRESS NOTES
Problem: Falls - Risk of 
Goal: *Absence of Falls Document [place] Fall Risk and appropriate interventions in the flowsheet. Outcome: Progressing Towards Goal 
Fall Risk Interventions: 
Mobility Interventions: Communicate number of staff needed for ambulation/transfer, Patient to call before getting OOB

## 2019-02-14 LAB
ANION GAP SERPL CALC-SCNC: 7 MMOL/L (ref 5–15)
BUN SERPL-MCNC: 13 MG/DL (ref 6–20)
BUN/CREAT SERPL: 16 (ref 12–20)
CALCIUM SERPL-MCNC: 8.3 MG/DL (ref 8.5–10.1)
CHLORIDE SERPL-SCNC: 108 MMOL/L (ref 97–108)
CO2 SERPL-SCNC: 25 MMOL/L (ref 21–32)
CREAT SERPL-MCNC: 0.83 MG/DL (ref 0.55–1.02)
DATE LAST DOSE: NORMAL
GLUCOSE SERPL-MCNC: 94 MG/DL (ref 65–100)
MAGNESIUM SERPL-MCNC: 2.1 MG/DL (ref 1.6–2.4)
PHOSPHATE SERPL-MCNC: 3.6 MG/DL (ref 2.6–4.7)
POTASSIUM SERPL-SCNC: 3.6 MMOL/L (ref 3.5–5.1)
REPORTED DOSE,DOSE: NORMAL UNITS
REPORTED DOSE/TIME,TMG: NORMAL
SODIUM SERPL-SCNC: 140 MMOL/L (ref 136–145)
VANCOMYCIN TROUGH SERPL-MCNC: 8.6 UG/ML (ref 5–10)

## 2019-02-14 PROCEDURE — 83735 ASSAY OF MAGNESIUM: CPT

## 2019-02-14 PROCEDURE — 80202 ASSAY OF VANCOMYCIN: CPT

## 2019-02-14 PROCEDURE — 80048 BASIC METABOLIC PNL TOTAL CA: CPT

## 2019-02-14 PROCEDURE — 74011250637 HC RX REV CODE- 250/637: Performed by: INTERNAL MEDICINE

## 2019-02-14 PROCEDURE — 36415 COLL VENOUS BLD VENIPUNCTURE: CPT

## 2019-02-14 PROCEDURE — 65270000032 HC RM SEMIPRIVATE

## 2019-02-14 PROCEDURE — 74011000258 HC RX REV CODE- 258: Performed by: INTERNAL MEDICINE

## 2019-02-14 PROCEDURE — 84100 ASSAY OF PHOSPHORUS: CPT

## 2019-02-14 PROCEDURE — 74011250636 HC RX REV CODE- 250/636: Performed by: INTERNAL MEDICINE

## 2019-02-14 RX ADMIN — PIPERACILLIN SODIUM,TAZOBACTAM SODIUM 3.38 G: 3; .375 INJECTION, POWDER, FOR SOLUTION INTRAVENOUS at 03:23

## 2019-02-14 RX ADMIN — Medication 10 ML: at 15:34

## 2019-02-14 RX ADMIN — PIPERACILLIN SODIUM,TAZOBACTAM SODIUM 3.38 G: 3; .375 INJECTION, POWDER, FOR SOLUTION INTRAVENOUS at 11:13

## 2019-02-14 RX ADMIN — ACETAMINOPHEN 1000 MG: 500 TABLET ORAL at 15:36

## 2019-02-14 RX ADMIN — VANCOMYCIN HYDROCHLORIDE 1000 MG: 1 INJECTION, POWDER, LYOPHILIZED, FOR SOLUTION INTRAVENOUS at 07:08

## 2019-02-14 RX ADMIN — Medication 10 ML: at 21:03

## 2019-02-14 RX ADMIN — HYDROCHLOROTHIAZIDE 25 MG: 25 TABLET ORAL at 09:00

## 2019-02-14 RX ADMIN — Medication 10 ML: at 03:23

## 2019-02-14 RX ADMIN — ATORVASTATIN CALCIUM 10 MG: 20 TABLET, FILM COATED ORAL at 17:07

## 2019-02-14 RX ADMIN — CYCLOBENZAPRINE 5 MG: 10 TABLET, FILM COATED ORAL at 17:07

## 2019-02-14 RX ADMIN — VENLAFAXINE HYDROCHLORIDE 150 MG: 150 CAPSULE, EXTENDED RELEASE ORAL at 17:07

## 2019-02-14 NOTE — CONSULTS
[address]    Name:  Rosa Puri  MR#:  [id number]  :  1955  ACCOUNT #:  [de-identified]  DATE OF SERVICE:  2019    The patient is in room# 602. ATTENDING PHYSICIAN:  Dr. Kiki Smith. CHIEF COMPLAINT:  Back pain. REASON FOR CONSULTATION:  L5-S1 discitis and vertebral osteomyelitis with an  associated epidural abscess. The consultation was requested by Dr. Caterina Anthony. History is obtained by interviewing the patient, review of the medical records. HISTORY OF PRESENT ILLNESS:  This patient is a 61-year  woman with a  remote history of breast cancer 15 years ago and also a history of hypertension and  hyperlipidemia. She dates the onset of her current problems at mid 2018. She is  undergoing physical therapy for right shoulder pain. She did some exercises and the  following day, she noted low back pain. She thought that she had pulled something  with the physical therapy. The pain also began radiating to both legs. The pain was  moderately severe by her description and persisted. The pain did not go away with  rest.  She states that she was started on physical therapy for her back in early  2018. At times, she describes the pain is being \"intense\" and it is often  radiating down the both legs to the calves. She has had low back pain ever since  then, although she thinks the pain actually has been getting somewhat better  recently. However, she still has significant pain and this limits her activities. She denies any weakness or numbness in her legs. She has had no bowel or bladder  dysfunction. She denies fever, shaking chills, and sweats. She has had no  infections over the past six months that she can recall. The patient apparently was  evaluated last week and had an MRI scan done on 2019 and it indicated L5-S1  discitis and vertebral osteomyelitis with extension to the epidural space.   The  patient was seen by Neurosurgery at [facility].  She was then referred  to admission to [facility] on 02/11/2019. At the time of admission, the  patient was started on vancomycin and Zosyn. She has now been on vancomycin and  Zosyn for 48 hours and we were asked to see her for further evaluation. She has had  no fever during her hospital stay. She does not recall any infections in the past  six months or more. She also takes doxycycline 20 mg p.o. b.i.d. chronically. PAST MEDICAL HISTORY:  Tobacco, she used to smoke a pack per day and quit in 05/2018. Alcohol, she has a couple of drinks at night. MEDICATIONS PRIOR TO ADMISSION:  1. Doxycycline 20 mg p.o. b.i.d.  2.  Hydrochlorothiazide 25 mg p.o. daily. 3.  Potassium chloride 10 mEq p.o. daily. 4.  Multivitamin - Centrum Silver for Women over 50 - one p.o. daily. 5.  Vitamin B12 of 1000 mcg p.o. daily. 6.  Calcium 600 mg plus Vitamin D3 - one p.o. daily. 7.  Atorvastatin 20 mg p.o. daily. 8.  Effexor 150 mg p.o. daily for hot flashes. ALLERGIES:  NONE KNOWN. ILLNESSES:  1. History of breast cancer involving the right breast 15 years ago, treated by  lumpectomy and radiation therapy with no known recurrence. 2.  Hypertension. 3.  Hyperlipidemia. SURGERIES:  1. Lumpectomy of the right breast 15 years ago. 2.  Surgery for removal of the cyst from the left third finger on two occasions. 3.  Bilateral tubal ligations. SOCIAL HISTORY:  The patient lives alone in [place]. 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 She has  no children. FAMILY HISTORY:  Otherwise unremarkable. REVIEW OF SYSTEMS:  CONSTITUTIONAL:  No fever, chills, sweats. HEENT:  No headache, visual change, sore throat. LYMPHATIC:  No history of adenopathy. NEUROLOGIC:  No recent rashes. RESPIRATORY:  No cough or hemoptysis. CARDIOVASCULAR:  No chest pain.   GASTROINTESTINAL:  No nausea, vomiting, diarrhea. GENITOURINARY:  No dysuria. MUSCULOSKELETAL:  Negative except as in HPI. NEUROLOGIC:  No history of weakness or numbness in the legs. PHYSICAL EXAMINATION:  GENERAL:  In no acute distress. VITAL SIGNS:  Blood pressure is 167/99, heart rate 91, respiratory rate 18. Her  T-max since admission to the hospital is 98.8 degrees Fahrenheit. HEENT:  Ears normal, sclerae and conjunctivae benign, EOMI, oropharynx benign. NECK:  Supple. NODES:  No adenopathy. SKIN:  No rashes. LUNGS:  Clear to A and P.  CARDIOVASCULAR:  Regular rate and rhythm with no murmurs. ABDOMEN:  Soft, nontender, no masses or HSM, bowel sounds present. BACK:  She has mild percussion tenderness in the lower lumbar spine. PELVIC:  Exam not done. EXTREMITIES:  No edema. MUSCULOSKELETAL:  Muscles are nontender and joints benign. NEUROLOGIC:  She has excellent strength in both legs and the sensation seems normal.    LABORATORY DATA:  CBC reveals a hemoglobin of 11.2, white count 6100 with no left  shift, and platelet count is 303,125. Sedimentation rate was 41 (normal 0-30). Chemistry data reveals BUN 14, creatinine 0.92. Microbiology data:  Blood cultures  on admission showed no growth. RADIOLOGY DATA:  MRI scan of the lumbar spine on 02/12/2019 revealed L5-S1 vertebral  osteomyelitis with slightly enlarged epidural abscess and L5-S1 severe canal  bilateral foraminal stenosis. There was also a multilevel canal foraminal stenosis  at L3-L4 and L4-L5. IMPRESSION:  1. L5-S1 discitis, vertebral osteomyelitis and epidural abscess - presumed  bacterial, although the etiologic microorganism is not known: This patient has been  on vancomycin and Zosyn for two days. Ideally, I would like to try to identify the  pathogen. Percutaneous aspiration and biopsy of L5-S1 would be a useful diagnostic  test.  However, it may be difficult to approach this level due to obstruction by the  iliac bones.   It would be useful to ask Interventional Radiology to review her films  and see whether or not they can do an aspiration and biopsy at this level. If they  can, we could consider stopping antibiotics for a few days and proceed in to this. She is clinically and neurologically stable. Without any culture data, we would need  to continue a broad-spectrum antibiotic therapy for a variety of potential pathogens. 2.  History of right breast cancer 15 years ago, treated by lumpectomy and radiation  therapy. 3.  Hypertension. 4.  Hyperlipidemia. RECOMMENDATIONS:  1.  I think we should ask Interventional Radiology if they can access the L4-S1 disc  percutaneously for cultures. If so, we could consider stopping antibiotics for few  days and proceeding to an aspiration and biopsy for culture. 2.  Without any culture data, we would anticipate a prolonged course of empiric  broad-spectrum antibiotic therapy. 3.  She still may end up needing surgical debridement, especially if the epidural  collection enlarges. Thank you very much for allowing us to see this patient with you.         Sarita Mohamud MD      [id number]  D:  02/14/2019 0:46  T:  02/14/2019 7:43  JOB #:  [id number]  CC:  MD Bhavin Campo Md Elane Mains, MD

## 2019-02-14 NOTE — PROGRESS NOTES
Pharmacist Note - Vancomycin Dosing Therapy day 4 Indication: L5-S1 vertebral osteomyelitis with epidural abscess. Current regimen: 1000 mg Q12H A Trough Level resulted at 8.6 mcg/mL which was obtained 11 hrs post-dose. The extrapolated \"true\" trough is approximately 7.7 mcg/mL based on the patient's known kinetics. Goal trough: 15 - 20 mcg/mL Plan: Change to 1000 mg Q8H IV . Based on patient's known pharmakokinetics, anticipate a trough of ~15 mcg/ml. Pharmacy will continue to monitor this patient daily for changes in clinical status and renal function.

## 2019-02-14 NOTE — PROGRESS NOTES
Hospitalist Progress Note Cari Barraza MD 
Answering service: [phone] OR [phone] from in house phone Date of Service:  2019 NAME:  Marjorie Barber :  1955 MRN:  [id number] Admission Summary:  
Marjorie Barber is a 58 y.o. female with PMH of breast ca s/p lumpectomy and radiation, htn, hld who presents with back pain. Has been having symptoms for the past 2 weeks. Met with PCP and underwent outpt MRI last Thursday found remarkable for L5-S1 vertebral osteomyelitis with epidural abscess and severe bilateral canal foraminal stenosis. Interval history / Subjective:  
Seen for f/u of OM 
NSGY evaluated, and no acute surgical needs at this time. Discussed case with Dr. Deny Vail. Given possible medical management, ideally would get a biopsy for targeted therapy. Prefers to hold antibiotics at this time, and perform IR guided biopsy in a few days. Pt doing well today, no acute complaints. Said yesterday she was up and walking around a bit too much and had some pain afterwards. Pain well controlled however when she receives her PRN meds. Assessment & Plan: Suspected L5-S1 osteomyelitis with epidural abscess 
- NSGY following, no acute surgical needs, will need o/p FU - DC Vanc/Zosyn, and monitor for SIRS response. - Flexeril PRN 
- Continue serial physical exams  
- PO norco for pain, IV morphine for breakthrough 
- Consult IR for possible biopsy early next week. Hypokalemia - replete as needed, continue to monitor HLD - home statin H/o breast cancer DCIS Menopause - home effexor Code status: FULL 
DVT prophylaxis: SCD Care Plan discussed with: Patient/Family Disposition: Home w/Family, patient ambulatory on admission. Hospital Problems  Date Reviewed: 2019 Codes Class Noted POA Epidural abscess ICD-10-CM: G06.2 ICD-9-CM: 324.9  2019 Unknown Review of Systems: A comprehensive review of systems was negative except for that written in the HPI. Vital Signs:  
 Last 24hrs VS reviewed since prior progress note. Most recent are: 
Visit Vitals BP (!) 156/93 Pulse 94 Temp 98 °F (36.7 °C) Resp 18 Ht 5' 4.5\" (1.638 m) Wt 79.4 kg (175 lb) SpO2 95% BMI 29.57 kg/m² No intake or output data in the 24 hours ending 02/14/19 1544 Physical Examination:  
 
 
     
Constitutional:  No acute distress, cooperative, pleasant   
ENT:  Oral mucous moist, oropharynx benign. Resp:  CTA bilaterally. No wheezing/rhonchi/rales. No accessory muscle use CV:  Regular rhythm, normal rate, no murmurs, gallops, rubs GI:  Soft, non distended, non tender. normoactive bowel sounds, no hepatosplenomegaly Musculoskeletal:  No edema, warm, 2+ pulses throughout, no spinal tenderness. Neurologic:  Moves all extremities. AAOx3, CN II-XII reviewed Skin:  Good turgor, no rashes or ulcers Data Review: All imaging and laboratory data reviewed. Labs:  
 
Recent Labs  
  02/13/19 
0238 02/12/19 
6392 WBC 6.1 6.3 HGB 11.2* 10.4* HCT 35.1 31.5*  177 Recent Labs  
  02/14/19 
4786 02/13/19 
0238 02/12/19 
7079  140 141  
K 3.6 3.5 3.8  106 109* CO2 25 26 25 BUN 13 14 13 CREA 0.83 0.92 0.84 GLU 94 97 120* CA 8.3* 8.6 8.0*  
MG 2.1 2.0  --   
PHOS 3.6 4.1  -- No results for input(s): SGOT, GPT, ALT, AP, TBIL, TBILI, TP, ALB, GLOB, GGT, AML, LPSE in the last 72 hours. No lab exists for component: AMYP, HLPSE No results for input(s): INR, PTP, APTT in the last 72 hours. No lab exists for component: INREXT, INREXT No results for input(s): FE, TIBC, PSAT, FERR in the last 72 hours. No results found for: FOL, RBCF No results for input(s): PH, PCO2, PO2 in the last 72 hours. No results for input(s): CPK, CKNDX, TROIQ in the last 72 hours. No lab exists for component: CPKMB No results found for: CHOL, CHOLX, CHLST, CHOLV, HDL, LDL, LDLC, DLDLP, TGLX, TRIGL, TRIGP, CHHD, CHHDX No results found for: [facility] No results found for: COLOR, APPRN, SPGRU, REFSG, ALEXA, PROTU, GLUCU, KETU, BILU, UROU, RANDY, LEUKU, GLUKE, EPSU, BACTU, WBCU, RBCU, CASTS, UCRY Medications Reviewed:  
 
Current Facility-Administered Medications Medication Dose Route Frequency  hydroCHLOROthiazide (HYDRODIURIL) tablet 25 mg  25 mg Oral DAILY  venlafaxine-SR (EFFEXOR-XR) capsule 150 mg  150 mg Oral QPM  
 calcium-vitamin D (OS-MICHAEL) 500 mg-200 unit tablet  1 Tab Oral DAILY  potassium chloride SR (KLOR-CON 10) tablet 10 mEq  10 mEq Oral DAILY  atorvastatin (LIPITOR) tablet 10 mg  10 mg Oral QPM  
 therapeutic multivitamin (THERAGRAN) tablet 1 Tab  1 Tab Oral DAILY  cyclobenzaprine (FLEXERIL) tablet 5 mg  5 mg Oral TID WITH MEALS  
 acetaminophen (TYLENOL) tablet 1,000 mg  1,000 mg Oral Q6H PRN  
 cyanocobalamin (VITAMIN B12) tablet 1,000 mcg  1,000 mcg Oral DAILY  sodium chloride (NS) flush 5-40 mL  5-40 mL IntraVENous Q8H  
 sodium chloride (NS) flush 5-40 mL  5-40 mL IntraVENous PRN  
 HYDROcodone-acetaminophen (NORCO) 5-325 mg per tablet 1 Tab  1 Tab Oral Q4H PRN  
 morphine injection 2 mg  2 mg IntraVENous Q4H PRN  
 ondansetron (ZOFRAN) injection 4 mg  4 mg IntraVENous Q4H PRN  
 
______________________________________________________________________ EXPECTED LENGTH OF STAY: 6d 2h 
ACTUAL LENGTH OF STAY:          3 
 
            
Ayan Watts MD

## 2019-02-14 NOTE — PROGRESS NOTES
Infectious Diseases Consultation Please see dictated note Impression 1. L5 - S1 discitis, vertebral osteomyelitis, and epidural abscess -- etiologic microorganism not known: The patient has been on Vanc + Zosyn x 2 days. 2. Right breast cancer in ~ 2004 treated with lumpectomy and RT 
 
3. HTN 
 
4. Hyperlipidemia Recommend:  
  
1. Would ask IR if they can access the L5-S1 disc percutaneously. If so, we could consider stopping antibiotics and aspirating for culture in a few days. If we are unable to identify the etiologic microorganism, empiric treatment be continued.  
  
 
Thanks,  
Nicolasa Temple MD 
2/13/2019 
8:01 PM

## 2019-02-14 NOTE — PROGRESS NOTES
Care Management Interventions Palliative Care Criteria Met (RRAT>21 & CHF Dx)?: No 
Mode of Transport at Discharge: Other (see comment)(Family will transport ) Transition of Care Consult (CM Consult): Discharge Planning Discharge Durable Medical Equipment: No 
Physical Therapy Consult: No 
Occupational Therapy Consult: No 
Speech Therapy Consult: No 
Current Support Network: Lives Alone Confirm Follow Up Transport: Self Discharge Location Discharge Placement: Home Patient presented to [facility] due to back pain and osteomyelitis. Past medical history significant for HTN, hypercholesterolemia, ductal carcinoma right breast s/p lumpectomy x 2 with radiation therapy. Patient lives in a private residence. Independent with ADLs and denies use of assistance devices. PCP is Dr. Tan Beckford. ID and Neurosurgery following. Pending blood cultures. Patient will likely to discharge home when medically stable. CM will continue to follow.

## 2019-02-15 PROCEDURE — 65270000032 HC RM SEMIPRIVATE

## 2019-02-15 PROCEDURE — 74011250637 HC RX REV CODE- 250/637: Performed by: INTERNAL MEDICINE

## 2019-02-15 RX ORDER — HYDROCHLOROTHIAZIDE 25 MG/1
25 TABLET ORAL DAILY
Status: DISCONTINUED | OUTPATIENT
Start: 2019-02-15 | End: 2019-02-25 | Stop reason: HOSPADM

## 2019-02-15 RX ADMIN — Medication 10 ML: at 06:55

## 2019-02-15 RX ADMIN — CYCLOBENZAPRINE 5 MG: 10 TABLET, FILM COATED ORAL at 06:55

## 2019-02-15 RX ADMIN — OYSTER SHELL CALCIUM WITH VITAMIN D 1 TABLET: 500; 200 TABLET, FILM COATED ORAL at 10:02

## 2019-02-15 RX ADMIN — CYANOCOBALAMIN TAB 500 MCG 1000 MCG: 500 TAB at 09:59

## 2019-02-15 RX ADMIN — VENLAFAXINE HYDROCHLORIDE 150 MG: 150 CAPSULE, EXTENDED RELEASE ORAL at 17:05

## 2019-02-15 RX ADMIN — THERA TABS 1 TABLET: TAB at 09:59

## 2019-02-15 RX ADMIN — POTASSIUM CHLORIDE 10 MEQ: 750 TABLET, FILM COATED, EXTENDED RELEASE ORAL at 10:02

## 2019-02-15 RX ADMIN — Medication 10 ML: at 20:36

## 2019-02-15 RX ADMIN — Medication 10 ML: at 14:48

## 2019-02-15 RX ADMIN — HYDROCHLOROTHIAZIDE 25 MG: 25 TABLET ORAL at 09:59

## 2019-02-15 RX ADMIN — CYCLOBENZAPRINE 5 MG: 10 TABLET, FILM COATED ORAL at 17:04

## 2019-02-15 RX ADMIN — ATORVASTATIN CALCIUM 10 MG: 20 TABLET, FILM COATED ORAL at 17:05

## 2019-02-15 NOTE — PROGRESS NOTES
Infectious Diseases Progress Note Antibiotic Summary: 
Vancomycin  --  (final dose 7 AM) Zosyn   --  (final dose 11 AM) Subjective: She feels about the same. No change in the pain. No numbness or weakness in the legs Objective:  
 
Vitals:  
Visit Vitals BP (!) 168/98 Pulse 93 Temp 98.3 °F (36.8 °C) Resp 18 Ht 5' 4.5\" (1.638 m) Wt 79.4 kg (175 lb) LMP  (LMP Unknown) Comment: LMP unknown SpO2 95% BMI 29.57 kg/m² Tmax:  Temp (24hrs), Av.3 °F (36.8 °C), Min:98 °F (36.7 °C), Max:98.4 °F (36.9 °C) Exam:  General appearance: alert, no distress Back: mild tenderness over the lower lumbar spine Lungs: clear to auscultation bilaterally Heart: regular rate and rhythm Abdomen: soft, non-tender. Bowel sounds normal. No masses,  no organomegaly Skin: no rash Neurologic: LE strength and sensation normal 
 
IV Lines: peripheral 
 
Labs:   
Recent Labs  
  19 
0638 19 
0238 19 
5699 WBC  --  6.1 6.3 HGB  --  11.2* 10.4* PLT  --  183 177 BUN 13 14 13 CREA 0.83 0.92 0.84 BLOOD CULTURES: 
  = NGSF Assessment: 1. L5 - S1 discitis, vertebral osteomyelitis, and epidural abscess -- etiologic microorganism not known: The patient has been on Vanc + Zosyn since  PM. I feel it would be useful to try to identify theetiologic microorganism. She is clinically and neurologically stable. I would favor holding antibiotics. During the week of , we will plan percutaneous biopsy and consider surgical biopsy if needed. Discussed with Dr. Iona Prince and Dr. Yassine Quigley to coordinate care. I explained all of this at length to the patient. I explained that both a needle biopsy and open surgical biopsy could yield a negative result and leave us with empiric broad spectrum coverage. I explained the potential difficulties of empiric Rx without identifying \"the enemy\". 
  
2. Right breast cancer in ~  treated with lumpectomy and RT 
  
3.  HTN 
   
4. Hyperlipidemia Plan: 1. Vanc and Zosyn have been stopped -- watch off antibiotics with plans for biopsy week of 2/18 Tamara De Luna MD

## 2019-02-15 NOTE — PROGRESS NOTES
Problem: Falls - Risk of 
Goal: *Absence of Falls Document Kerry Adame Fall Risk and appropriate interventions in the flowsheet. Outcome: Progressing Towards Goal 
Fall Risk Interventions: 
Mobility Interventions: Communicate number of staff needed for ambulation/transfer Medication Interventions: Patient to call before getting OOB

## 2019-02-15 NOTE — PROGRESS NOTES
Hospitalist Progress Note Eddie Gunderson MD 
Answering service: [phone] OR [phone] from in house phone Date of Service:  2/15/2019 NAME:  Luis Carlos Rivero :  1955 MRN:  [id number] Admission Summary:  
Luis Carlos Rivero is a 58 y.o. female with PMH of breast ca s/p lumpectomy and radiation, htn, hld who presents with back pain. Has been having symptoms for the past 2 weeks. Met with PCP and underwent outpt MRI last Thursday found remarkable for L5-S1 vertebral osteomyelitis with epidural abscess and severe bilateral canal foraminal stenosis. Interval history / Subjective:  
Seen for f/u of OM No events overnight, no fevers, no chills. Still have the back pain during movement. No antibiotics overnight and stable. Assessment & Plan: Suspected L5-S1 osteomyelitis with epidural abscess 
- NSGY following, no acute surgical needs, will need o/p FU - now off Vanc/Zosyn, and monitor for SIRS response. - Flexeril scheduled. - Continue serial physical exams  
- PO norco for pain, IV morphine for breakthrough 
- Consult IR for possible biopsy early next week. Hypokalemia - replete as needed, continue to monitor HLD - home statin H/o breast cancer DCIS Menopause - home effexor Code status: FULL 
DVT prophylaxis: SCD Care Plan discussed with: Patient/Family Disposition: Home w/Family, patient ambulatory on admission. Hospital Problems  Date Reviewed: 2019 Codes Class Noted POA Epidural abscess ICD-10-CM: G06.2 ICD-9-CM: 324.9  2019 Unknown Review of Systems: A comprehensive review of systems was negative except for that written in the HPI. Vital Signs:  
 Last 24hrs VS reviewed since prior progress note. Most recent are: 
Visit Vitals BP (!) 151/99 Pulse 98 Temp 97.2 °F (36.2 °C) Resp 18 Ht 5' 4.5\" (1.638 m) Wt 79.4 kg (175 lb) SpO2 99% BMI 29.57 kg/m² Intake/Output Summary (Last 24 hours) at 2/15/2019 1043 Last data filed at 2/14/2019 1700 Gross per 24 hour Intake 100 ml Output  Net 100 ml Physical Examination:  
 
 
     
Constitutional:  No acute distress, cooperative, pleasant   
ENT:  Oral mucous moist, oropharynx benign. Resp:  CTA bilaterally. No wheezing/rhonchi/rales. No accessory muscle use CV:  Regular rhythm, normal rate, no murmurs, gallops, rubs GI:  Soft, non distended, non tender. normoactive bowel sounds, no hepatosplenomegaly Musculoskeletal:  No edema, warm, 2+ pulses throughout, no spinal tenderness. Neurologic:  Moves all extremities. AAOx3, CN II-XII reviewed Skin:  Good turgor, no rashes or ulcers Data Review: All imaging and laboratory data reviewed. Labs:  
 
Recent Labs  
  02/13/19 
3494 WBC 6.1 HGB 11.2* HCT 35.1  Recent Labs  
  02/14/19 
4823 02/13/19 
5661  140  
K 3.6 3.5  106 CO2 25 26 BUN 13 14 CREA 0.83 0.92  
GLU 94 97  
CA 8.3* 8.6 MG 2.1 2.0 PHOS 3.6 4.1 No results for input(s): SGOT, GPT, ALT, AP, TBIL, TBILI, TP, ALB, GLOB, GGT, AML, LPSE in the last 72 hours. No lab exists for component: AMYP, HLPSE No results for input(s): INR, PTP, APTT in the last 72 hours. No lab exists for component: INREXT, INREXT No results for input(s): FE, TIBC, PSAT, FERR in the last 72 hours. No results found for: FOL, RBCF No results for input(s): PH, PCO2, PO2 in the last 72 hours. No results for input(s): CPK, CKNDX, TROIQ in the last 72 hours. No lab exists for component: CPKMB No results found for: CHOL, CHOLX, CHLST, CHOLV, HDL, LDL, LDLC, DLDLP, TGLX, TRIGL, TRIGP, CHHD, CHHDX No results found for: Ayush Bores No results found for: COLOR, APPRN, SPGRU, REFSG, ALEXA, PROTU, GLUCU, KETU, BILU, UROU, RANDY, LEUKU, GLUKE, EPSU, BACTU, WBCU, RBCU, CASTS, UCRY Medications Reviewed: Current Facility-Administered Medications Medication Dose Route Frequency  hydroCHLOROthiazide (HYDRODIURIL) tablet 25 mg  25 mg Oral DAILY  venlafaxine-SR (EFFEXOR-XR) capsule 150 mg  150 mg Oral QPM  
 calcium-vitamin D (OS-MICHAEL) 500 mg-200 unit tablet  1 Tab Oral DAILY  potassium chloride SR (KLOR-CON 10) tablet 10 mEq  10 mEq Oral DAILY  atorvastatin (LIPITOR) tablet 10 mg  10 mg Oral QPM  
 therapeutic multivitamin (THERAGRAN) tablet 1 Tab  1 Tab Oral DAILY  cyclobenzaprine (FLEXERIL) tablet 5 mg  5 mg Oral TID WITH MEALS  
 acetaminophen (TYLENOL) tablet 1,000 mg  1,000 mg Oral Q6H PRN  
 cyanocobalamin (VITAMIN B12) tablet 1,000 mcg  1,000 mcg Oral DAILY  sodium chloride (NS) flush 5-40 mL  5-40 mL IntraVENous Q8H  
 sodium chloride (NS) flush 5-40 mL  5-40 mL IntraVENous PRN  
 HYDROcodone-acetaminophen (NORCO) 5-325 mg per tablet 1 Tab  1 Tab Oral Q4H PRN  
 morphine injection 2 mg  2 mg IntraVENous Q4H PRN  
 ondansetron (ZOFRAN) injection 4 mg  4 mg IntraVENous Q4H PRN  
 
______________________________________________________________________ EXPECTED LENGTH OF STAY: 6d 2h 
ACTUAL LENGTH OF STAY:          4 
 
            
Elisa Galvin MD

## 2019-02-15 NOTE — PROGRESS NOTES
Participated in IDR round this morning. Consult IR for biopsy on Monday Feb 18, 2019. Patient will complete IV abx today. CM will continue to follow. MELANIE Whitney,CRM

## 2019-02-16 LAB
BACTERIA SPEC CULT: NORMAL
SERVICE CMNT-IMP: NORMAL

## 2019-02-16 PROCEDURE — 74011250637 HC RX REV CODE- 250/637: Performed by: INTERNAL MEDICINE

## 2019-02-16 PROCEDURE — 65270000032 HC RM SEMIPRIVATE

## 2019-02-16 RX ADMIN — ATORVASTATIN CALCIUM 10 MG: 20 TABLET, FILM COATED ORAL at 18:03

## 2019-02-16 RX ADMIN — Medication 10 ML: at 22:00

## 2019-02-16 RX ADMIN — HYDROCHLOROTHIAZIDE 25 MG: 25 TABLET ORAL at 08:37

## 2019-02-16 RX ADMIN — Medication 10 ML: at 11:27

## 2019-02-16 RX ADMIN — POTASSIUM CHLORIDE 10 MEQ: 750 TABLET, FILM COATED, EXTENDED RELEASE ORAL at 08:38

## 2019-02-16 RX ADMIN — VENLAFAXINE HYDROCHLORIDE 150 MG: 150 CAPSULE, EXTENDED RELEASE ORAL at 18:03

## 2019-02-16 RX ADMIN — CYANOCOBALAMIN TAB 500 MCG 1000 MCG: 500 TAB at 08:37

## 2019-02-16 RX ADMIN — OYSTER SHELL CALCIUM WITH VITAMIN D 1 TABLET: 500; 200 TABLET, FILM COATED ORAL at 08:37

## 2019-02-16 RX ADMIN — CYCLOBENZAPRINE 5 MG: 10 TABLET, FILM COATED ORAL at 18:03

## 2019-02-16 RX ADMIN — CYCLOBENZAPRINE 5 MG: 10 TABLET, FILM COATED ORAL at 11:27

## 2019-02-16 RX ADMIN — THERA TABS 1 TABLET: TAB at 08:37

## 2019-02-16 RX ADMIN — CYCLOBENZAPRINE 5 MG: 10 TABLET, FILM COATED ORAL at 07:16

## 2019-02-16 RX ADMIN — Medication 10 ML: at 07:16

## 2019-02-16 NOTE — PROGRESS NOTES
Hospitalist Progress Note Melani Patricia MD 
Answering service: [phone] OR [phone] from in house phone Date of Service:  2019 NAME:  Marleni Kumar :  1955 MRN:  [id number] Admission Summary:  
Marleni Kumar is a 58 y.o. female with PMH of breast ca s/p lumpectomy and radiation, htn, hld who presents with back pain. Has been having symptoms for the past 2 weeks. Met with PCP and underwent outpt MRI last Thursday found remarkable for L5-S1 vertebral osteomyelitis with epidural abscess and severe bilateral canal foraminal stenosis. Interval history / Subjective:  
Seen for f/u of OM No complaints today, pain is well controlled on her current regimen. No signs of infection, denies any fevers. Assessment & Plan: Suspected L5-S1 osteomyelitis with epidural abscess 
- NSGY following, no acute surgical needs, will need o/p FU - now off Vanc/Zosyn, and monitor for SIRS response. - Flexeril scheduled. - Continue serial physical exams  
- PO norco for pain, IV morphine for breakthrough 
- Consult IR for possible biopsy early next week. Hypokalemia - replete as needed, continue to monitor HLD - home statin H/o breast cancer DCIS Menopause - home effexor Code status: FULL 
DVT prophylaxis: SCD Care Plan discussed with: Patient/Family Disposition: Home w/Family, patient ambulatory on admission. Hospital Problems  Date Reviewed: 2019 Codes Class Noted POA Epidural abscess ICD-10-CM: G06.2 ICD-9-CM: 324.9  2019 Unknown Review of Systems: A comprehensive review of systems was negative except for that written in the HPI. Vital Signs:  
 Last 24hrs VS reviewed since prior progress note. Most recent are: 
Visit Vitals /90 (BP 1 Location: Right arm, BP Patient Position: At rest) Pulse 100 Temp 98.7 °F (37.1 °C) Resp 17  
 Ht 5' 4.5\" (1.638 m) Wt 79.4 kg (175 lb) SpO2 98% BMI 29.57 kg/m² No intake or output data in the 24 hours ending 02/16/19 1614 Physical Examination:  
 
 
     
Constitutional:  No acute distress, cooperative, pleasant   
ENT:  Oral mucous moist, oropharynx benign. Resp:  CTA bilaterally. No wheezing/rhonchi/rales. No accessory muscle use CV:  Regular rhythm, normal rate, no murmurs, gallops, rubs GI:  Soft, non distended, non tender. normoactive bowel sounds, no hepatosplenomegaly Musculoskeletal:  No edema, warm, 2+ pulses throughout, no spinal tenderness. Neurologic:  Moves all extremities. AAOx3, CN II-XII reviewed Skin:  Good turgor, no rashes or ulcers Data Review: All imaging and laboratory data reviewed. Labs:  
 
No results for input(s): WBC, HGB, HCT, PLT, HGBEXT, HCTEXT, PLTEXT, HGBEXT, HCTEXT, PLTEXT in the last 72 hours. Recent Labs  
  02/14/19 
3423   
K 3.6  CO2 25 BUN 13  
CREA 0.83 GLU 94  
CA 8.3*  
MG 2.1 PHOS 3.6 No results for input(s): SGOT, GPT, ALT, AP, TBIL, TBILI, TP, ALB, GLOB, GGT, AML, LPSE in the last 72 hours. No lab exists for component: AMYP, HLPSE No results for input(s): INR, PTP, APTT in the last 72 hours. No lab exists for component: INREXT, INREXT No results for input(s): FE, TIBC, PSAT, FERR in the last 72 hours. No results found for: FOL, RBCF No results for input(s): PH, PCO2, PO2 in the last 72 hours. No results for input(s): CPK, CKNDX, TROIQ in the last 72 hours. No lab exists for component: CPKMB No results found for: CHOL, CHOLX, CHLST, CHOLV, HDL, LDL, LDLC, DLDLP, TGLX, TRIGL, TRIGP, CHHD, CHHDX No results found for: Enrrique Gelacio No results found for: COLOR, APPRN, SPGRU, REFSG, ALEXA, PROTU, GLUCU, KETU, BILU, UROU, RANDY, LEUKU, GLUKE, EPSU, BACTU, WBCU, RBCU, CASTS, UCRY Medications Reviewed:  
 
Current Facility-Administered Medications Medication Dose Route Frequency  hydroCHLOROthiazide (HYDRODIURIL) tablet 25 mg  25 mg Oral DAILY  venlafaxine-SR (EFFEXOR-XR) capsule 150 mg  150 mg Oral QPM  
 calcium-vitamin D (OS-MICHAEL) 500 mg-200 unit tablet  1 Tab Oral DAILY  potassium chloride SR (KLOR-CON 10) tablet 10 mEq  10 mEq Oral DAILY  atorvastatin (LIPITOR) tablet 10 mg  10 mg Oral QPM  
 therapeutic multivitamin (THERAGRAN) tablet 1 Tab  1 Tab Oral DAILY  cyclobenzaprine (FLEXERIL) tablet 5 mg  5 mg Oral TID WITH MEALS  
 acetaminophen (TYLENOL) tablet 1,000 mg  1,000 mg Oral Q6H PRN  
 cyanocobalamin (VITAMIN B12) tablet 1,000 mcg  1,000 mcg Oral DAILY  sodium chloride (NS) flush 5-40 mL  5-40 mL IntraVENous Q8H  
 sodium chloride (NS) flush 5-40 mL  5-40 mL IntraVENous PRN  
 HYDROcodone-acetaminophen (NORCO) 5-325 mg per tablet 1 Tab  1 Tab Oral Q4H PRN  
 morphine injection 2 mg  2 mg IntraVENous Q4H PRN  
 ondansetron (ZOFRAN) injection 4 mg  4 mg IntraVENous Q4H PRN  
 
______________________________________________________________________ EXPECTED LENGTH OF STAY: 6d 2h 
ACTUAL LENGTH OF STAY:          5 
 
            
Josh Steele MD

## 2019-02-16 NOTE — PROGRESS NOTES
Infectious Diseases Progress Note Antibiotic Summary: 
Vancomycin  --  (final dose 7 AM) Zosyn   --  (final dose 11 AM) Subjective:  
 
She ambulated in the [facility]ob[facility] today. Back pain is stable Objective:  
 
Vitals:  
Visit Vitals /88 (BP 1 Location: Left arm, BP Patient Position: At rest) Pulse 99 Temp 98.7 °F (37.1 °C) Resp 18 Ht 5' 4.5\" (1.638 m) Wt 79.4 kg (175 lb) LMP  (LMP Unknown) Comment: LMP unknown SpO2 97% BMI 29.57 kg/m² Tmax:  Temp (24hrs), Av.2 °F (36.8 °C), Min:97.2 °F (36.2 °C), Max:98.7 °F (37.1 °C) Exam:  General appearance: alert, no distress Lungs: clear to auscultation bilaterally Heart: regular rate and rhythm Abdomen: soft, non-tender. Bowel sounds normal.  
Neurologic: LE strength and sensation normal 
 
IV Lines: peripheral 
 
Labs:   
Recent Labs  
  19 
0638 19 
0238 WBC  --  6.1 HGB  --  11.2*  
PLT  --  183 BUN 13 14 CREA 0.83 0.92  
 
BLOOD CULTURES: 
  = NGSF Assessment: 1. L5 - S1 discitis, vertebral osteomyelitis, and epidural abscess -- etiologic microorganism not known: The patient has been on Vanc + Zosyn since  PM. I feel it would be useful to try to identify theetiologic microorganism. She is clinically and neurologically stable. We have decided to hold antibiotics. During the week of , we will plan percutaneous biopsy and consider surgical biopsy if needed. I explained all of this at length to the patient. I explained that both a needle biopsy and open surgical biopsy could yield a negative result and leave us with empiric broad spectrum coverage. I explained the potential difficulties of empiric Rx without identifying \"the enemy\". 
  
2. Right breast cancer in ~  treated with lumpectomy and RT 
  
3. HTN 
  
4. Hyperlipidemia Plan:  
 
1. Watch off antibiotics with plans for biopsy week of  I'll check the patient again on Monday. Please call if problems arise over the weekend.  
 
Enrique Ritter MD

## 2019-02-17 PROCEDURE — 74011250637 HC RX REV CODE- 250/637: Performed by: INTERNAL MEDICINE

## 2019-02-17 PROCEDURE — 65270000032 HC RM SEMIPRIVATE

## 2019-02-17 RX ADMIN — OYSTER SHELL CALCIUM WITH VITAMIN D 1 TABLET: 500; 200 TABLET, FILM COATED ORAL at 09:20

## 2019-02-17 RX ADMIN — Medication 10 ML: at 09:24

## 2019-02-17 RX ADMIN — Medication 10 ML: at 20:38

## 2019-02-17 RX ADMIN — CYANOCOBALAMIN TAB 500 MCG 1000 MCG: 500 TAB at 09:21

## 2019-02-17 RX ADMIN — CYCLOBENZAPRINE 5 MG: 10 TABLET, FILM COATED ORAL at 17:13

## 2019-02-17 RX ADMIN — POTASSIUM CHLORIDE 10 MEQ: 750 TABLET, FILM COATED, EXTENDED RELEASE ORAL at 09:21

## 2019-02-17 RX ADMIN — CYCLOBENZAPRINE 5 MG: 10 TABLET, FILM COATED ORAL at 13:08

## 2019-02-17 RX ADMIN — CYCLOBENZAPRINE 5 MG: 10 TABLET, FILM COATED ORAL at 07:11

## 2019-02-17 RX ADMIN — ATORVASTATIN CALCIUM 10 MG: 20 TABLET, FILM COATED ORAL at 17:13

## 2019-02-17 RX ADMIN — HYDROCHLOROTHIAZIDE 25 MG: 25 TABLET ORAL at 09:20

## 2019-02-17 RX ADMIN — VENLAFAXINE HYDROCHLORIDE 150 MG: 150 CAPSULE, EXTENDED RELEASE ORAL at 17:13

## 2019-02-17 RX ADMIN — Medication 10 ML: at 06:00

## 2019-02-17 RX ADMIN — THERA TABS 1 TABLET: TAB at 09:21

## 2019-02-17 NOTE — PROGRESS NOTES
Hospitalist Progress Note Emely Rivas MD 
Answering service: [phone] OR [phone] from in house phone Date of Service:  2019 NAME:  Arline Doll :  1955 MRN:  [id number] Admission Summary:  
Arline Doll is a 58 y.o. female with PMH of breast ca s/p lumpectomy and radiation, htn, hld who presents with back pain. Has been having symptoms for the past 2 weeks. Met with PCP and underwent outpt MRI last Thursday found remarkable for L5-S1 vertebral osteomyelitis with epidural abscess and severe bilateral canal foraminal stenosis. Interval history / Subjective:  
Seen for f/u of OM Up and walking the halls, no complaints today. Assessment & Plan: Suspected L5-S1 osteomyelitis with epidural abscess 
- NSGY following, no acute surgical needs, will need o/p FU - now off Vanc/Zosyn, and monitor for SIRS response. - Flexeril scheduled. - Continue serial physical exams  
- PO norco for pain, IV morphine for breakthrough 
- Consult IR for possible biopsy tomorrow. Hypokalemia - replete as needed, continue to monitor HLD - home statin H/o breast cancer DCIS Menopause - home effexor Code status: FULL 
DVT prophylaxis: SCD Care Plan discussed with: Patient/Family Disposition: Home w/Family, patient ambulatory on admission. Hospital Problems  Date Reviewed: 2019 Codes Class Noted POA Epidural abscess ICD-10-CM: G06.2 ICD-9-CM: 324.9  2019 Unknown Review of Systems: A comprehensive review of systems was negative except for that written in the HPI. Vital Signs:  
 Last 24hrs VS reviewed since prior progress note. Most recent are: 
Visit Vitals /82 (BP 1 Location: Right arm, BP Patient Position: At rest) Pulse 89 Temp 98.3 °F (36.8 °C) Resp 18 Ht 5' 4.5\" (1.638 m) Wt 79.4 kg (175 lb) SpO2 99% BMI 29.57 kg/m² Intake/Output Summary (Last 24 hours) at 2/17/2019 1356 Last data filed at 2/16/2019 1805 Gross per 24 hour Intake 360 ml Output  Net 360 ml Physical Examination:  
 
 
     
Constitutional:  No acute distress, cooperative, pleasant   
ENT:  Oral mucous moist, oropharynx benign. Resp:  CTA bilaterally. No wheezing/rhonchi/rales. No accessory muscle use CV:  Regular rhythm, normal rate, no murmurs, gallops, rubs GI:  Soft, non distended, non tender. normoactive bowel sounds, no hepatosplenomegaly Musculoskeletal:  No edema, warm, 2+ pulses throughout, no spinal tenderness. Neurologic:  Moves all extremities. AAOx3, CN II-XII reviewed Skin:  Good turgor, no rashes or ulcers Data Review: All imaging and laboratory data reviewed. Labs:  
 
No results for input(s): WBC, HGB, HCT, PLT, HGBEXT, HCTEXT, PLTEXT, HGBEXT, HCTEXT, PLTEXT in the last 72 hours. No results for input(s): NA, K, CL, CO2, BUN, CREA, GLU, CA, MG, PHOS, URICA in the last 72 hours. No results for input(s): SGOT, GPT, ALT, AP, TBIL, TBILI, TP, ALB, GLOB, GGT, AML, LPSE in the last 72 hours. No lab exists for component: AMYP, HLPSE No results for input(s): INR, PTP, APTT in the last 72 hours. No lab exists for component: INREXT, INREXT No results for input(s): FE, TIBC, PSAT, FERR in the last 72 hours. No results found for: FOL, RBCF No results for input(s): PH, PCO2, PO2 in the last 72 hours. No results for input(s): CPK, CKNDX, TROIQ in the last 72 hours. No lab exists for component: CPKMB No results found for: CHOL, CHOLX, CHLST, CHOLV, HDL, LDL, LDLC, DLDLP, TGLX, TRIGL, TRIGP, CHHD, CHHDX No results found for: [place] No results found for: COLOR, APPRN, SPGRU, REFSG, ALEXA, PROTU, GLUCU, KETU, BILU, UROU, RANDY, LEUKU, GLUKE, EPSU, BACTU, WBCU, RBCU, CASTS, UCRY Medications Reviewed:  
 
Current Facility-Administered Medications Medication Dose Route Frequency  hydroCHLOROthiazide (HYDRODIURIL) tablet 25 mg  25 mg Oral DAILY  venlafaxine-SR (EFFEXOR-XR) capsule 150 mg  150 mg Oral QPM  
 calcium-vitamin D (OS-MICHAEL) 500 mg-200 unit tablet  1 Tab Oral DAILY  potassium chloride SR (KLOR-CON 10) tablet 10 mEq  10 mEq Oral DAILY  atorvastatin (LIPITOR) tablet 10 mg  10 mg Oral QPM  
 therapeutic multivitamin (THERAGRAN) tablet 1 Tab  1 Tab Oral DAILY  cyclobenzaprine (FLEXERIL) tablet 5 mg  5 mg Oral TID WITH MEALS  
 acetaminophen (TYLENOL) tablet 1,000 mg  1,000 mg Oral Q6H PRN  
 cyanocobalamin (VITAMIN B12) tablet 1,000 mcg  1,000 mcg Oral DAILY  sodium chloride (NS) flush 5-40 mL  5-40 mL IntraVENous Q8H  
 sodium chloride (NS) flush 5-40 mL  5-40 mL IntraVENous PRN  
 HYDROcodone-acetaminophen (NORCO) 5-325 mg per tablet 1 Tab  1 Tab Oral Q4H PRN  
 morphine injection 2 mg  2 mg IntraVENous Q4H PRN  
 ondansetron (ZOFRAN) injection 4 mg  4 mg IntraVENous Q4H PRN  
 
______________________________________________________________________ EXPECTED LENGTH OF STAY: 6d 2h 
ACTUAL LENGTH OF STAY:          6 
 
            
Elisa Galvin MD

## 2019-02-17 NOTE — PROGRESS NOTES
Problem: Falls - Risk of 
Goal: *Absence of Falls Document Tammy Webster Fall Risk and appropriate interventions in the flowsheet. Outcome: Progressing Towards Goal 
Fall Risk Interventions: 
Mobility Interventions: Communicate number of staff needed for ambulation/transfer, Patient to call before getting OOB Medication Interventions: Evaluate medications/consider consulting pharmacy, Patient to call before getting OOB, Teach patient to arise slowly History of Falls Interventions: Door open when patient unattended, Evaluate medications/consider consulting pharmacy

## 2019-02-18 ENCOUNTER — APPOINTMENT (OUTPATIENT)
Dept: INTERVENTIONAL RADIOLOGY/VASCULAR | Age: 64
DRG: 477 | End: 2019-02-18
Attending: NURSE PRACTITIONER
Payer: COMMERCIAL

## 2019-02-18 PROCEDURE — 77030007812 HC INTRO SPN TRCR KYPH -D

## 2019-02-18 PROCEDURE — 74011250637 HC RX REV CODE- 250/637: Performed by: INTERNAL MEDICINE

## 2019-02-18 PROCEDURE — 77030011218 HC DEV BIOP BN KYPH -C

## 2019-02-18 PROCEDURE — 87075 CULTR BACTERIA EXCEPT BLOOD: CPT

## 2019-02-18 PROCEDURE — 74011250636 HC RX REV CODE- 250/636: Performed by: RADIOLOGY

## 2019-02-18 PROCEDURE — 74011000250 HC RX REV CODE- 250: Performed by: STUDENT IN AN ORGANIZED HEALTH CARE EDUCATION/TRAINING PROGRAM

## 2019-02-18 PROCEDURE — 87102 FUNGUS ISOLATION CULTURE: CPT

## 2019-02-18 PROCEDURE — 65270000032 HC RM SEMIPRIVATE

## 2019-02-18 PROCEDURE — 77030003666 HC NDL SPINAL BD -A

## 2019-02-18 PROCEDURE — 77002 NEEDLE LOCALIZATION BY XRAY: CPT

## 2019-02-18 PROCEDURE — 87205 SMEAR GRAM STAIN: CPT

## 2019-02-18 PROCEDURE — 87116 MYCOBACTERIA CULTURE: CPT

## 2019-02-18 RX ORDER — FENTANYL CITRATE 50 UG/ML
200 INJECTION, SOLUTION INTRAMUSCULAR; INTRAVENOUS
Status: DISCONTINUED | OUTPATIENT
Start: 2019-02-18 | End: 2019-02-18

## 2019-02-18 RX ORDER — CEFAZOLIN SODIUM 1 G/3ML
2 INJECTION, POWDER, FOR SOLUTION INTRAMUSCULAR; INTRAVENOUS ONCE
Status: DISCONTINUED | OUTPATIENT
Start: 2019-02-18 | End: 2019-02-18 | Stop reason: SDUPTHER

## 2019-02-18 RX ORDER — SODIUM CHLORIDE 9 MG/ML
25 INJECTION, SOLUTION INTRAVENOUS CONTINUOUS
Status: DISCONTINUED | OUTPATIENT
Start: 2019-02-18 | End: 2019-02-18

## 2019-02-18 RX ORDER — MIDAZOLAM HYDROCHLORIDE 1 MG/ML
10 INJECTION, SOLUTION INTRAMUSCULAR; INTRAVENOUS
Status: DISCONTINUED | OUTPATIENT
Start: 2019-02-18 | End: 2019-02-18

## 2019-02-18 RX ORDER — BUPIVACAINE HYDROCHLORIDE 5 MG/ML
50 INJECTION, SOLUTION EPIDURAL; INTRACAUDAL ONCE
Status: COMPLETED | OUTPATIENT
Start: 2019-02-18 | End: 2019-02-18

## 2019-02-18 RX ORDER — CEFAZOLIN SODIUM/WATER 2 G/20 ML
2 SYRINGE (ML) INTRAVENOUS ONCE
Status: DISCONTINUED | OUTPATIENT
Start: 2019-02-18 | End: 2019-02-18

## 2019-02-18 RX ADMIN — FENTANYL CITRATE 25 MCG: 50 INJECTION, SOLUTION INTRAMUSCULAR; INTRAVENOUS at 16:21

## 2019-02-18 RX ADMIN — MIDAZOLAM HYDROCHLORIDE 1 MG: 1 INJECTION, SOLUTION INTRAMUSCULAR; INTRAVENOUS at 16:11

## 2019-02-18 RX ADMIN — VENLAFAXINE HYDROCHLORIDE 150 MG: 150 CAPSULE, EXTENDED RELEASE ORAL at 17:50

## 2019-02-18 RX ADMIN — Medication 10 ML: at 15:25

## 2019-02-18 RX ADMIN — ATORVASTATIN CALCIUM 10 MG: 20 TABLET, FILM COATED ORAL at 17:51

## 2019-02-18 RX ADMIN — Medication 10 ML: at 19:54

## 2019-02-18 RX ADMIN — MIDAZOLAM HYDROCHLORIDE 1 MG: 1 INJECTION, SOLUTION INTRAMUSCULAR; INTRAVENOUS at 16:15

## 2019-02-18 RX ADMIN — Medication 10 ML: at 05:12

## 2019-02-18 RX ADMIN — MIDAZOLAM HYDROCHLORIDE 0.5 MG: 1 INJECTION, SOLUTION INTRAMUSCULAR; INTRAVENOUS at 16:20

## 2019-02-18 RX ADMIN — MIDAZOLAM HYDROCHLORIDE 0.5 MG: 1 INJECTION, SOLUTION INTRAMUSCULAR; INTRAVENOUS at 16:24

## 2019-02-18 RX ADMIN — SODIUM CHLORIDE 25 ML/HR: 900 INJECTION, SOLUTION INTRAVENOUS at 16:02

## 2019-02-18 RX ADMIN — FENTANYL CITRATE 25 MCG: 50 INJECTION, SOLUTION INTRAMUSCULAR; INTRAVENOUS at 16:15

## 2019-02-18 RX ADMIN — FENTANYL CITRATE 25 MCG: 50 INJECTION, SOLUTION INTRAMUSCULAR; INTRAVENOUS at 16:10

## 2019-02-18 RX ADMIN — CYCLOBENZAPRINE 5 MG: 10 TABLET, FILM COATED ORAL at 17:50

## 2019-02-18 RX ADMIN — HYDROCHLOROTHIAZIDE 25 MG: 25 TABLET ORAL at 17:55

## 2019-02-18 RX ADMIN — BUPIVACAINE HYDROCHLORIDE 10 ML: 5 INJECTION, SOLUTION EPIDURAL; INTRACAUDAL; PERINEURAL at 16:31

## 2019-02-18 NOTE — PROGRESS NOTES
Spoke with Dr Alessio Leyva on the phone about whether or not the patient has been off of antibiotics enough to get biopsy done today. Dr Alessio Leyva stated that he wanted the biopsy done today. Contacted IR to inform them that Dr Alessio Leyva wanted it done today.

## 2019-02-18 NOTE — PROGRESS NOTES
TRANSFER - OUT REPORT: 
 
Verbal report given to Amauri Enriquez RN(name) on Marjorie Barber  being transferred to [phone] 
(unit) for routine progression of care Report consisted of patients Situation, Background, Assessment and  
Recommendations(SBAR). Information from the following report(s) Procedure Summary and MAR was reviewed with the receiving nurse. Lines:  
Peripheral IV 02/11/19 Left Antecubital (Active) Site Assessment Clean, dry, & intact 2/18/2019 10:52 AM  
Phlebitis Assessment 0 2/18/2019 10:52 AM  
Infiltration Assessment 0 2/18/2019 10:52 AM  
Dressing Status Clean, dry, & intact 2/18/2019 10:52 AM  
Dressing Type Transparent 2/18/2019 10:52 AM  
Hub Color/Line Status Flushed;Capped 2/18/2019 10:52 AM  
Action Taken Open ports on tubing capped 2/18/2019 10:52 AM  
Alcohol Cap Used Yes 2/18/2019 10:52 AM  
  
 
Opportunity for questions and clarification was provided. Patient transported with: 
 Registered Nurse

## 2019-02-18 NOTE — PROGRESS NOTES
Hospitalist Progress Note Beti Moffett MD 
Answering service: [phone] OR [phone] from in house phone Date of Service:  2019 NAME:  Erin Vizcarra :  1955 MRN:  [id number] Admission Summary:  
Erin Vizcarra is a 58 y.o. female with PMH of breast ca s/p lumpectomy and radiation, htn, hld who presents with back pain. Has been having symptoms for the past 2 weeks. Met with PCP and underwent outpt MRI last Thursday found remarkable for L5-S1 vertebral osteomyelitis with epidural abscess and severe bilateral canal foraminal stenosis. Interval history / Subjective:  
Seen for f/u of OM No pain or fevers today. Awaiting IR biopsy today. Assessment & Plan: Suspected L5-S1 osteomyelitis with epidural abscess 
- NSGY following, no acute surgical needs, will need o/p FU - now off Vanc/Zosyn, and monitor for SIRS response. - Flexeril scheduled. - Continue serial physical exams  
- PO norco for pain, IV morphine for breakthrough - To get IR guided biopsy today, will likely need PICC tomorrow - Need recommendations regarding antibtioics prior to discharge, will need to await culture data. Hypokalemia - replete as needed, continue to monitor HLD - home statin H/o breast cancer DCIS Menopause - home effexor Code status: FULL 
DVT prophylaxis: SCD Care Plan discussed with: Patient/Family Disposition: Home w/Family, patient ambulatory on admission. Hospital Problems  Date Reviewed: 2019 Codes Class Noted POA Epidural abscess ICD-10-CM: G06.2 ICD-9-CM: 324.9  2019 Unknown Review of Systems: A comprehensive review of systems was negative except for that written in the HPI. Vital Signs:  
 Last 24hrs VS reviewed since prior progress note. Most recent are: 
Visit Vitals /78 (BP 1 Location: Right arm, BP Patient Position: Sitting) Pulse 96 Temp 97.9 °F (36.6 °C) Resp 16 Ht 5' 4.5\" (1.638 m) Wt 79.4 kg (175 lb) SpO2 94% BMI 29.57 kg/m² No intake or output data in the 24 hours ending 02/18/19 1556 Physical Examination:  
 
 
     
Constitutional:  No acute distress, cooperative, pleasant   
ENT:  Oral mucous moist, oropharynx benign. Resp:  CTA bilaterally. No wheezing/rhonchi/rales. No accessory muscle use CV:  Regular rhythm, normal rate, no murmurs, gallops, rubs GI:  Soft, non distended, non tender. normoactive bowel sounds, no hepatosplenomegaly Musculoskeletal:  No edema, warm, 2+ pulses throughout, no spinal tenderness. Neurologic:  Moves all extremities. AAOx3, CN II-XII reviewed Skin:  Good turgor, no rashes or ulcers Data Review: All imaging and laboratory data reviewed. Labs:  
 
No results for input(s): WBC, HGB, HCT, PLT, HGBEXT, HCTEXT, PLTEXT, HGBEXT, HCTEXT, PLTEXT in the last 72 hours. No results for input(s): NA, K, CL, CO2, BUN, CREA, GLU, CA, MG, PHOS, URICA in the last 72 hours. No results for input(s): SGOT, GPT, ALT, AP, TBIL, TBILI, TP, ALB, GLOB, GGT, AML, LPSE in the last 72 hours. No lab exists for component: AMYP, HLPSE No results for input(s): INR, PTP, APTT in the last 72 hours. No lab exists for component: INREXT, INREXT No results for input(s): FE, TIBC, PSAT, FERR in the last 72 hours. No results found for: FOL, RBCF No results for input(s): PH, PCO2, PO2 in the last 72 hours. No results for input(s): CPK, CKNDX, TROIQ in the last 72 hours. No lab exists for component: CPKMB No results found for: CHOL, CHOLX, CHLST, CHOLV, HDL, LDL, LDLC, DLDLP, TGLX, TRIGL, TRIGP, CHHD, CHHDX No results found for: Amrita [place] No results found for: COLOR, APPRN, SPGRU, REFSG, ALEXA, PROTU, GLUCU, KETU, BILU, UROU, RANDY, LEUKU, GLUKE, EPSU, BACTU, WBCU, RBCU, CASTS, UCRY Medications Reviewed:  
 
Current Facility-Administered Medications Medication Dose Route Frequency  bupivacaine (PF) (MARCAINE) 0.5 % (5 mg/mL) injection 250 mg  50 mL SubCUTAneous ONCE  
 midazolam (VERSED) injection 10 mg  10 mg IntraVENous Multiple  fentaNYL citrate (PF) injection 200 mcg  200 mcg IntraVENous Multiple  0.9% sodium chloride infusion  25 mL/hr IntraVENous CONTINUOUS  
 ceFAZolin (ANCEF) 2 g/20 mL in sterile water IV syringe  2 g IntraVENous ONCE  hydroCHLOROthiazide (HYDRODIURIL) tablet 25 mg  25 mg Oral DAILY  venlafaxine-SR (EFFEXOR-XR) capsule 150 mg  150 mg Oral QPM  
 calcium-vitamin D (OS-MICHAEL) 500 mg-200 unit tablet  1 Tab Oral DAILY  potassium chloride SR (KLOR-CON 10) tablet 10 mEq  10 mEq Oral DAILY  atorvastatin (LIPITOR) tablet 10 mg  10 mg Oral QPM  
 therapeutic multivitamin (THERAGRAN) tablet 1 Tab  1 Tab Oral DAILY  cyclobenzaprine (FLEXERIL) tablet 5 mg  5 mg Oral TID WITH MEALS  
 acetaminophen (TYLENOL) tablet 1,000 mg  1,000 mg Oral Q6H PRN  
 cyanocobalamin (VITAMIN B12) tablet 1,000 mcg  1,000 mcg Oral DAILY  sodium chloride (NS) flush 5-40 mL  5-40 mL IntraVENous Q8H  
 sodium chloride (NS) flush 5-40 mL  5-40 mL IntraVENous PRN  
 HYDROcodone-acetaminophen (NORCO) 5-325 mg per tablet 1 Tab  1 Tab Oral Q4H PRN  
 morphine injection 2 mg  2 mg IntraVENous Q4H PRN  
 ondansetron (ZOFRAN) injection 4 mg  4 mg IntraVENous Q4H PRN  
 
______________________________________________________________________ EXPECTED LENGTH OF STAY: 6d 2h 
ACTUAL LENGTH OF STAY:          7 
 
            
Laureen Calero MD

## 2019-02-18 NOTE — PROGRESS NOTES
NUTRITION Pt seen for:    
 
[]           Supplements  []             PO intake check  
[]           Food Allergies  []             Food Preferences/tolerances [x]           Rescreen   []             Education []           Diet order clarification []             Other RECOMMENDATIONS:  
 
Will follow per protocol SUBJECTIVE/OBJECTIVE: Information obtained from:  EHR, pt Pt seen for LOS. Pt admitted with epidural abscess. PMHx: breast ca s/p lumpectomy and radiation, htn, hyperlipidemia. Reviewed chart, spoke with pt. Pt eating very well but states sometimes hungry between meals; will add snacks (likes cheese/crackers, roast beef/turkey sand, chicken salad). Will follow per protocol. Diet:  NPO (was Regular) Intake: [x]           Good     []           Fair      []           Poor Patient Vitals for the past 100 hrs: 
 % Diet Eaten 02/16/19 1805 100 % 02/14/19 1700 100 % 02/14/19 1237 100 % Weight Changes: []            Loss 
[]            Gain 
[x]            Stable Nutrition Problems Identified:[x]      None PLAN:  
 
[x]           Rescreen per screening protocol [x]           Add Snacks, per pt request 
 
Rescreen:  []            At Nutrition Risk [x]            Not at Nutrition Risk, rescreen per screening protocol John [address] RD

## 2019-02-18 NOTE — PROGRESS NOTES
Problem: Falls - Risk of 
Goal: *Absence of Falls Document Meenakshi Crespo Fall Risk and appropriate interventions in the flowsheet. Outcome: Progressing Towards Goal 
Fall Risk Interventions: 
Mobility Interventions: Communicate number of staff needed for ambulation/transfer, Patient to call before getting OOB Medication Interventions: Evaluate medications/consider consulting pharmacy, Patient to call before getting OOB, Teach patient to arise slowly History of Falls Interventions: Door open when patient unattended, Evaluate medications/consider consulting pharmacy

## 2019-02-18 NOTE — H&P
Radiology History and Physical 
 
Patient: Sharlene Byrd 58 y.o. female Chief Complaint: Referral / Consult History of Present Illness: for disc bx History: 
 
Past Medical History:  
Diagnosis Date  Breast cancer [facility]) Feb. and  Right breast lumpectomy with radiation.  Ductal carcinoma ([address] right  Ductal carcinoma in situ of breast 2004 Right breast lumpectomy with radiation.  HTN (hypertension)  Hypercholesterolemia  Menopause LMP unknown  S/P radiation therapy  Right breast lumpectomy Family History Problem Relation Age of Onset  Hypertension Mother  Kidney Disease Mother  Stroke Mother  Cancer Father   
     lymphoma  Cancer Sister   
     lukemia Social History Socioeconomic History  Marital status: SINGLE Spouse name: Not on file  Number of children: Not on file  Years of education: Not on file  Highest education level: Not on file Social Needs  Financial resource strain: Not on file  Food insecurity - worry: Not on file  Food insecurity - inability: Not on file  Transportation needs - medical: Not on file  Transportation needs - non-medical: Not on file Occupational History  Not on file Tobacco Use  Smoking status: Former Smoker Packs/day: 1.00 Years: 30.00 Pack years: 30.00 Last attempt to quit: 2010 Years since quittin.1  Smokeless tobacco: Never Used Substance and Sexual Activity  Alcohol use: No  
 Drug use: Not on file  Sexual activity: Not on file Other Topics Concern  Not on file Social History Narrative  Not on file Allergies: No Known Allergies Current Medications: 
Current Facility-Administered Medications Medication Dose Route Frequency  bupivacaine (PF) (MARCAINE) 0.5 % (5 mg/mL) injection 250 mg  50 mL SubCUTAneous ONCE  
 midazolam (VERSED) injection 10 mg  10 mg IntraVENous Multiple  fentaNYL citrate (PF) injection 200 mcg  200 mcg IntraVENous Multiple  0.9% sodium chloride infusion  25 mL/hr IntraVENous CONTINUOUS  
 ceFAZolin (ANCEF) 2 g/20 mL in sterile water IV syringe  2 g IntraVENous ONCE  hydroCHLOROthiazide (HYDRODIURIL) tablet 25 mg  25 mg Oral DAILY  venlafaxine-SR (EFFEXOR-XR) capsule 150 mg  150 mg Oral QPM  
 calcium-vitamin D (OS-MICHAEL) 500 mg-200 unit tablet  1 Tab Oral DAILY  potassium chloride SR (KLOR-CON 10) tablet 10 mEq  10 mEq Oral DAILY  atorvastatin (LIPITOR) tablet 10 mg  10 mg Oral QPM  
 therapeutic multivitamin (THERAGRAN) tablet 1 Tab  1 Tab Oral DAILY  cyclobenzaprine (FLEXERIL) tablet 5 mg  5 mg Oral TID WITH MEALS  
 acetaminophen (TYLENOL) tablet 1,000 mg  1,000 mg Oral Q6H PRN  
 cyanocobalamin (VITAMIN B12) tablet 1,000 mcg  1,000 mcg Oral DAILY  sodium chloride (NS) flush 5-40 mL  5-40 mL IntraVENous Q8H  
 sodium chloride (NS) flush 5-40 mL  5-40 mL IntraVENous PRN  
 HYDROcodone-acetaminophen (NORCO) 5-325 mg per tablet 1 Tab  1 Tab Oral Q4H PRN  
 morphine injection 2 mg  2 mg IntraVENous Q4H PRN  
 ondansetron (ZOFRAN) injection 4 mg  4 mg IntraVENous Q4H PRN Physical Exam: 
Blood pressure 127/89, pulse 91, temperature 97.9 °F (36.6 °C), resp. rate 14, height 5' 4.5\" (1.638 m), weight 79.4 kg (175 lb), SpO2 98 %. LUNG: clear to auscultation bilaterally, HEART: regular rate and rhythm Alerts:   
Hospital Problems  Date Reviewed: 2/12/2019 Codes Class Noted POA Epidural abscess ICD-10-CM: G06.2 ICD-9-CM: 324.9  2/11/2019 Unknown Laboratory:    No results for input(s): HGB, HCT, WBC, PLT, INR, BUN, CREA, K, CRCLT, HGBEXT, HCTEXT, PLTEXT in the last 72 hours. No lab exists for component: PTT, PT, INREXT Plan of Care/Planned Procedure: 
Risks, benefits, and alternatives reviewed with patient and she agrees to proceed with the procedure.   
 
 
Elías Warner MD

## 2019-02-19 LAB
ANION GAP SERPL CALC-SCNC: 7 MMOL/L (ref 5–15)
BUN SERPL-MCNC: 16 MG/DL (ref 6–20)
BUN/CREAT SERPL: 20 (ref 12–20)
CALCIUM SERPL-MCNC: 8.8 MG/DL (ref 8.5–10.1)
CHLORIDE SERPL-SCNC: 102 MMOL/L (ref 97–108)
CO2 SERPL-SCNC: 29 MMOL/L (ref 21–32)
CREAT SERPL-MCNC: 0.82 MG/DL (ref 0.55–1.02)
GLUCOSE SERPL-MCNC: 84 MG/DL (ref 65–100)
MAGNESIUM SERPL-MCNC: 2.1 MG/DL (ref 1.6–2.4)
PHOSPHATE SERPL-MCNC: 4.7 MG/DL (ref 2.6–4.7)
POTASSIUM SERPL-SCNC: 3.3 MMOL/L (ref 3.5–5.1)
SODIUM SERPL-SCNC: 138 MMOL/L (ref 136–145)

## 2019-02-19 PROCEDURE — 65270000032 HC RM SEMIPRIVATE

## 2019-02-19 PROCEDURE — 74011250637 HC RX REV CODE- 250/637: Performed by: INTERNAL MEDICINE

## 2019-02-19 PROCEDURE — 83735 ASSAY OF MAGNESIUM: CPT

## 2019-02-19 PROCEDURE — 84100 ASSAY OF PHOSPHORUS: CPT

## 2019-02-19 PROCEDURE — 77030027138 HC INCENT SPIROMETER -A

## 2019-02-19 PROCEDURE — 36415 COLL VENOUS BLD VENIPUNCTURE: CPT

## 2019-02-19 PROCEDURE — 94760 N-INVAS EAR/PLS OXIMETRY 1: CPT

## 2019-02-19 PROCEDURE — 80048 BASIC METABOLIC PNL TOTAL CA: CPT

## 2019-02-19 PROCEDURE — 74011250637 HC RX REV CODE- 250/637: Performed by: HOSPITALIST

## 2019-02-19 RX ORDER — POTASSIUM CHLORIDE 750 MG/1
20 TABLET, FILM COATED, EXTENDED RELEASE ORAL
Status: COMPLETED | OUTPATIENT
Start: 2019-02-19 | End: 2019-02-19

## 2019-02-19 RX ADMIN — Medication 10 ML: at 20:27

## 2019-02-19 RX ADMIN — HYDROCHLOROTHIAZIDE 25 MG: 25 TABLET ORAL at 08:59

## 2019-02-19 RX ADMIN — CYCLOBENZAPRINE 5 MG: 10 TABLET, FILM COATED ORAL at 08:00

## 2019-02-19 RX ADMIN — ATORVASTATIN CALCIUM 10 MG: 20 TABLET, FILM COATED ORAL at 17:28

## 2019-02-19 RX ADMIN — Medication 10 ML: at 13:26

## 2019-02-19 RX ADMIN — VENLAFAXINE HYDROCHLORIDE 150 MG: 150 CAPSULE, EXTENDED RELEASE ORAL at 17:28

## 2019-02-19 RX ADMIN — POTASSIUM CHLORIDE 10 MEQ: 750 TABLET, FILM COATED, EXTENDED RELEASE ORAL at 08:59

## 2019-02-19 RX ADMIN — CYCLOBENZAPRINE 5 MG: 10 TABLET, FILM COATED ORAL at 11:22

## 2019-02-19 RX ADMIN — Medication 10 ML: at 04:51

## 2019-02-19 RX ADMIN — THERA TABS 1 TABLET: TAB at 08:59

## 2019-02-19 RX ADMIN — CYANOCOBALAMIN TAB 500 MCG 1000 MCG: 500 TAB at 08:59

## 2019-02-19 RX ADMIN — CYCLOBENZAPRINE 5 MG: 10 TABLET, FILM COATED ORAL at 17:28

## 2019-02-19 RX ADMIN — POTASSIUM CHLORIDE 20 MEQ: 750 TABLET, EXTENDED RELEASE ORAL at 09:57

## 2019-02-19 RX ADMIN — OYSTER SHELL CALCIUM WITH VITAMIN D 1 TABLET: 500; 200 TABLET, FILM COATED ORAL at 08:59

## 2019-02-19 NOTE — PROGRESS NOTES
Participated in [facility] rounds this morning. Biopsy done yesterday. Awaiting results. IV abx completed. Plan to discharge home when medically cleared.   CM will continue to follow.  
MELANIE Duggan,CRM

## 2019-02-19 NOTE — PROGRESS NOTES
Hospitalist Progress Note Mars Caicedo MD 
Answering service: [phone] OR [phone] from in house phone Date of Service:  2019 NAME:  Reagan Stiles :  1955 MRN:  [id number] Admission Summary:  
Reagan Stiles is a 58 y.o. female with PMH of breast ca s/p lumpectomy and radiation, htn, hld who presents with back pain. Has been having symptoms for the past 2 weeks. Met with PCP and underwent outpt MRI last Thursday found remarkable for L5-S1 vertebral osteomyelitis with epidural abscess and severe bilateral canal foraminal stenosis. Interval history / Subjective:  
Seen for f/u of OM No pain or fevers today. Ambulating in hallway. Assessment & Plan: Suspected L5-S1 osteomyelitis with epidural abscess 
- NSGY following, no acute surgical needs, will need o/p FU - now off Vanc/Zosyn, and monitor for SIRS response. - Flexeril scheduled. - Continue serial physical exams  
- PO norco for pain, IV morphine for breakthrough -  IR guided biopsy  
- Need recommendations regarding antibtioics prior to discharge, will need to await culture data. PICC line if ok with ID Hypokalemia - replete as needed, continue to monitor HLD - home statin H/o breast cancer DCIS Menopause - home effexor Code status: FULL 
DVT prophylaxis: SCD Care Plan discussed with: Patient/Family Disposition: Home w/Family once cleared with ID. patient ambulatory on admission. Hospital Problems  Date Reviewed: 2019 Codes Class Noted POA Epidural abscess ICD-10-CM: G06.2 ICD-9-CM: 324.9  2019 Unknown Review of Systems: A comprehensive review of systems was negative except for that written in the HPI. Vital Signs:  
 Last 24hrs VS reviewed since prior progress note. Most recent are: 
Visit Vitals /84 (BP 1 Location: Left arm, BP Patient Position: At rest) Pulse 95 Temp 98.5 °F (36.9 °C) Resp 16 Ht 5' 4.5\" (1.638 m) Wt 79.4 kg (175 lb) SpO2 99% BMI 29.57 kg/m² No intake or output data in the 24 hours ending 02/19/19 0906 Physical Examination:  
 
 
     
Constitutional:  No acute distress, cooperative, pleasant   
ENT:  Oral mucous moist, oropharynx benign. Resp:  CTA bilaterally. No wheezing/rhonchi/rales. No accessory muscle use CV:  Regular rhythm, normal rate, no murmurs, gallops, rubs GI:  Soft, non distended, non tender. normoactive bowel sounds, no hepatosplenomegaly Musculoskeletal:  No edema, warm, 2+ pulses throughout, no spinal tenderness. Neurologic:  Moves all extremities. AAOx3, CN II-XII reviewed Skin:  Good turgor, no rashes or ulcers Data Review: All imaging and laboratory data reviewed. Labs:  
 
No results for input(s): WBC, HGB, HCT, PLT, HGBEXT, HCTEXT, PLTEXT, HGBEXT, HCTEXT, PLTEXT in the last 72 hours. Recent Labs  
  02/19/19 
4458   
K 3.3*  
 CO2 29 BUN 16  
CREA 0.82 GLU 84  
CA 8.8 MG 2.1 PHOS 4.7 No results for input(s): SGOT, GPT, ALT, AP, TBIL, TBILI, TP, ALB, GLOB, GGT, AML, LPSE in the last 72 hours. No lab exists for component: AMYP, HLPSE No results for input(s): INR, PTP, APTT in the last 72 hours. No lab exists for component: INREXT, INREXT No results for input(s): FE, TIBC, PSAT, FERR in the last 72 hours. No results found for: FOL, RBCF No results for input(s): PH, PCO2, PO2 in the last 72 hours. No results for input(s): CPK, CKNDX, TROIQ in the last 72 hours. No lab exists for component: CPKMB No results found for: CHOL, CHOLX, CHLST, CHOLV, HDL, LDL, LDLC, DLDLP, TGLX, TRIGL, TRIGP, CHHD, CHHDX No results found for: Burgess Farrell No results found for: COLOR, APPRN, SPGRU, REFSG, ALEXA, PROTU, GLUCU, KETU, BILU, UROU, RANDY, LEUKU, GLUKE, EPSU, BACTU, WBCU, RBCU, CASTS, UCRY Medications Reviewed: Current Facility-Administered Medications Medication Dose Route Frequency  hydroCHLOROthiazide (HYDRODIURIL) tablet 25 mg  25 mg Oral DAILY  venlafaxine-SR (EFFEXOR-XR) capsule 150 mg  150 mg Oral QPM  
 calcium-vitamin D (OS-MICHAEL) 500 mg-200 unit tablet  1 Tab Oral DAILY  potassium chloride SR (KLOR-CON 10) tablet 10 mEq  10 mEq Oral DAILY  atorvastatin (LIPITOR) tablet 10 mg  10 mg Oral QPM  
 therapeutic multivitamin (THERAGRAN) tablet 1 Tab  1 Tab Oral DAILY  cyclobenzaprine (FLEXERIL) tablet 5 mg  5 mg Oral TID WITH MEALS  
 acetaminophen (TYLENOL) tablet 1,000 mg  1,000 mg Oral Q6H PRN  
 cyanocobalamin (VITAMIN B12) tablet 1,000 mcg  1,000 mcg Oral DAILY  sodium chloride (NS) flush 5-40 mL  5-40 mL IntraVENous Q8H  
 sodium chloride (NS) flush 5-40 mL  5-40 mL IntraVENous PRN  
 HYDROcodone-acetaminophen (NORCO) 5-325 mg per tablet 1 Tab  1 Tab Oral Q4H PRN  
 morphine injection 2 mg  2 mg IntraVENous Q4H PRN  
 ondansetron (ZOFRAN) injection 4 mg  4 mg IntraVENous Q4H PRN  
 
______________________________________________________________________ EXPECTED LENGTH OF STAY: 6d 2h 
ACTUAL LENGTH OF STAY:          8 
 
            
Wes Mai MD

## 2019-02-19 NOTE — PROGRESS NOTES
Infectious Diseases Progress Note Antibiotic Summary: 
Vancomycin  --  (final dose 7 AM) Zosyn   --  (final dose 11 AM) Subjective: No new symptoms Objective:  
 
Vitals:  
Visit Vitals /81 (BP 1 Location: Right arm, BP Patient Position: At rest) Pulse 96 Temp 98.4 °F (36.9 °C) Resp 16 Ht 5' 4.5\" (1.638 m) Wt 79.4 kg (175 lb) LMP  (LMP Unknown) Comment: LMP unknown SpO2 98% BMI 29.57 kg/m² Tmax:  Temp (24hrs), Av.6 °F (37 °C), Min:97.9 °F (36.6 °C), Max:99.4 °F (37.4 °C) Exam:  General appearance: alert, no distress Lungs: clear to auscultation bilaterally Heart: regular rate and rhythm Abdomen: soft, non-tender. Bowel sounds normal.  
Neurologic: LE strength and sensation normal 
 
IV Lines: peripheral 
 
Labs:   
No results for input(s): WBC, HGB, PLT, NEUT, BUN, CREA, TBIL, TBILI, SGOT, AP, HGBEXT, PLTEXT, HGBEXT, PLTEXT in the last 72 hours. BLOOD CULTURES: 
  = NGSF Aspiration & biopsy L5-S1 on 2019: 
 Gram stain = rare WBCs; no organisms seen (initial report of occasional GPC in clusters was an error) Aerobic culture = pending Anaerobic culture = pending Fungal culture = pending AFB smear = pending AFB culture = pending Assessment: 1. L5 - S1 discitis, vertebral osteomyelitis, and epidural abscess -- etiologic microorganism not known: The patient is stable after today's biopsy 
  
2. Right breast cancer in ~  treated with lumpectomy and RT 
  
3. HTN 
  
4. Hyperlipidemia Plan: 1. Await results of the  aspiration and biopsy Shy Overton MD

## 2019-02-20 LAB
ANION GAP SERPL CALC-SCNC: 8 MMOL/L (ref 5–15)
BASOPHILS # BLD: 0 K/UL (ref 0–0.1)
BASOPHILS NFR BLD: 0 % (ref 0–1)
BUN SERPL-MCNC: 17 MG/DL (ref 6–20)
BUN/CREAT SERPL: 20 (ref 12–20)
CALCIUM SERPL-MCNC: 8.9 MG/DL (ref 8.5–10.1)
CHLORIDE SERPL-SCNC: 103 MMOL/L (ref 97–108)
CO2 SERPL-SCNC: 28 MMOL/L (ref 21–32)
CREAT SERPL-MCNC: 0.86 MG/DL (ref 0.55–1.02)
DIFFERENTIAL METHOD BLD: NORMAL
EOSINOPHIL # BLD: 0.1 K/UL (ref 0–0.4)
EOSINOPHIL NFR BLD: 1 % (ref 0–7)
ERYTHROCYTE [DISTWIDTH] IN BLOOD BY AUTOMATED COUNT: 14.4 % (ref 11.5–14.5)
GLUCOSE SERPL-MCNC: 96 MG/DL (ref 65–100)
HCT VFR BLD AUTO: 36.5 % (ref 35–47)
HGB BLD-MCNC: 11.7 G/DL (ref 11.5–16)
IMM GRANULOCYTES # BLD AUTO: 0 K/UL (ref 0–0.04)
IMM GRANULOCYTES NFR BLD AUTO: 0 % (ref 0–0.5)
LYMPHOCYTES # BLD: 2.4 K/UL (ref 0.8–3.5)
LYMPHOCYTES NFR BLD: 43 % (ref 12–49)
MCH RBC QN AUTO: 29.4 PG (ref 26–34)
MCHC RBC AUTO-ENTMCNC: 32.1 G/DL (ref 30–36.5)
MCV RBC AUTO: 91.7 FL (ref 80–99)
MONOCYTES # BLD: 0.7 K/UL (ref 0–1)
MONOCYTES NFR BLD: 13 % (ref 5–13)
NEUTS SEG # BLD: 2.4 K/UL (ref 1.8–8)
NEUTS SEG NFR BLD: 43 % (ref 32–75)
NRBC # BLD: 0 K/UL (ref 0–0.01)
NRBC BLD-RTO: 0 PER 100 WBC
PLATELET # BLD AUTO: 168 K/UL (ref 150–400)
PMV BLD AUTO: 9.9 FL (ref 8.9–12.9)
POTASSIUM SERPL-SCNC: 3.5 MMOL/L (ref 3.5–5.1)
RBC # BLD AUTO: 3.98 M/UL (ref 3.8–5.2)
SODIUM SERPL-SCNC: 139 MMOL/L (ref 136–145)
WBC # BLD AUTO: 5.6 K/UL (ref 3.6–11)

## 2019-02-20 PROCEDURE — 65270000032 HC RM SEMIPRIVATE

## 2019-02-20 PROCEDURE — 74011250637 HC RX REV CODE- 250/637: Performed by: INTERNAL MEDICINE

## 2019-02-20 PROCEDURE — 36415 COLL VENOUS BLD VENIPUNCTURE: CPT

## 2019-02-20 PROCEDURE — 85025 COMPLETE CBC W/AUTO DIFF WBC: CPT

## 2019-02-20 PROCEDURE — 80048 BASIC METABOLIC PNL TOTAL CA: CPT

## 2019-02-20 RX ADMIN — Medication 10 ML: at 14:24

## 2019-02-20 RX ADMIN — CYCLOBENZAPRINE 5 MG: 10 TABLET, FILM COATED ORAL at 12:10

## 2019-02-20 RX ADMIN — CYANOCOBALAMIN TAB 500 MCG 1000 MCG: 500 TAB at 10:02

## 2019-02-20 RX ADMIN — CYCLOBENZAPRINE 5 MG: 10 TABLET, FILM COATED ORAL at 07:11

## 2019-02-20 RX ADMIN — THERA TABS 1 TABLET: TAB at 10:03

## 2019-02-20 RX ADMIN — VENLAFAXINE HYDROCHLORIDE 150 MG: 150 CAPSULE, EXTENDED RELEASE ORAL at 17:11

## 2019-02-20 RX ADMIN — CYCLOBENZAPRINE 5 MG: 10 TABLET, FILM COATED ORAL at 17:11

## 2019-02-20 RX ADMIN — ATORVASTATIN CALCIUM 10 MG: 20 TABLET, FILM COATED ORAL at 17:11

## 2019-02-20 RX ADMIN — OYSTER SHELL CALCIUM WITH VITAMIN D 1 TABLET: 500; 200 TABLET, FILM COATED ORAL at 10:03

## 2019-02-20 RX ADMIN — HYDROCHLOROTHIAZIDE 25 MG: 25 TABLET ORAL at 10:03

## 2019-02-20 RX ADMIN — Medication 10 ML: at 21:31

## 2019-02-20 RX ADMIN — POTASSIUM CHLORIDE 10 MEQ: 750 TABLET, FILM COATED, EXTENDED RELEASE ORAL at 10:03

## 2019-02-20 RX ADMIN — Medication 10 ML: at 06:20

## 2019-02-20 NOTE — PROGRESS NOTES
Hospitalist Progress Note Markel Jaime MD 
Answering service: [phone] OR [phone] from in house phone Date of Service:  2019 NAME:  Toshia Lee :  1955 MRN:  [id number] Admission Summary:  
Toshia Lee is a 58 y.o. female with PMH of breast ca s/p lumpectomy and radiation, htn, hld who presents with back pain. Has been having symptoms for the past 2 weeks. Met with PCP and underwent outpt MRI last Thursday found remarkable for L5-S1 vertebral osteomyelitis with epidural abscess and severe bilateral canal foraminal stenosis. Interval history / Subjective:  
Seen for f/u of OM No pain or fevers today. Denies Discharge. Assessment & Plan: Suspected L5-S1 osteomyelitis with epidural abscess 
- NSGY following, no acute surgical needs, will need o/p FU - now off Vanc/Zosyn, and monitor for SIRS response. - Flexeril scheduled. - Continue serial physical exams  
- PO norco for pain, IV morphine for breakthrough -  IR guided biopsy - Cultures pending - Need recommendations regarding antibtioics prior to discharge, will need to await culture data. PICC line if ok with ID Hypokalemia - replete as needed, continue to monitor HLD - home statin H/o breast cancer DCIS Depression post menopausal  - home effexor Code status: FULL 
DVT prophylaxis: SCD Care Plan discussed with: Patient/Family Disposition: Home w/Family once cleared with ID. patient ambulatory on admission. Hospital Problems  Date Reviewed: 2019 Codes Class Noted POA Epidural abscess ICD-10-CM: G06.2 ICD-9-CM: 324.9  2019 Unknown Review of Systems: A comprehensive review of systems was negative except for that written in the HPI. Vital Signs:  
 Last 24hrs VS reviewed since prior progress note. Most recent are: 
Visit Vitals /87 (BP 1 Location: Right arm, BP Patient Position: At rest) Pulse 82 Temp 98.6 °F (37 °C) Resp 18 Ht 5' 4.5\" (1.638 m) Wt 79.4 kg (175 lb) SpO2 100% BMI 29.57 kg/m² No intake or output data in the 24 hours ending 02/20/19 0729 Physical Examination:  
 
 
     
Constitutional:  No acute distress, cooperative, pleasant   
ENT:  Oral mucous moist, oropharynx benign. Resp:  CTA bilaterally. No wheezing/rhonchi/rales. No accessory muscle use CV:  Regular rhythm, normal rate, no murmurs, gallops, rubs GI:  Soft, non distended, non tender. normoactive bowel sounds, no hepatosplenomegaly Musculoskeletal:  No edema, warm, 2+ pulses throughout, no spinal tenderness. Neurologic:  Moves all extremities. AAOx3, CN II-XII reviewed Skin:  Good turgor, no rashes or ulcers Data Review: All imaging and laboratory data reviewed. Labs:  
 
Recent Labs  
  02/20/19 
8033 WBC 5.6 HGB 11.7 HCT 36.5  Recent Labs  
  02/20/19 
9433 02/19/19 
5494  138  
K 3.5 3.3*  
 102 CO2 28 29 BUN 17 16 CREA 0.86 0.82 GLU 96 84 CA 8.9 8.8 MG  --  2.1 PHOS  --  4.7 No results for input(s): SGOT, GPT, ALT, AP, TBIL, TBILI, TP, ALB, GLOB, GGT, AML, LPSE in the last 72 hours. No lab exists for component: AMYP, HLPSE No results for input(s): INR, PTP, APTT in the last 72 hours. No lab exists for component: INREXT, INREXT No results for input(s): FE, TIBC, PSAT, FERR in the last 72 hours. No results found for: FOL, RBCF No results for input(s): PH, PCO2, PO2 in the last 72 hours. No results for input(s): CPK, CKNDX, TROIQ in the last 72 hours. No lab exists for component: CPKMB No results found for: CHOL, CHOLX, CHLST, CHOLV, HDL, LDL, LDLC, DLDLP, TGLX, TRIGL, TRIGP, CHHD, CHHDX No results found for: Anny Balsam No results found for: COLOR, APPRN, SPGRU, REFSG, ALEXA, PROTU, GLUCU, KETU, Brodie Konig, RANDY, LEUKU, GLUKE, EPSU, BACTU, WBCU, RBCU, CASTS, UCRY Medications Reviewed:  
 
Current Facility-Administered Medications Medication Dose Route Frequency  hydroCHLOROthiazide (HYDRODIURIL) tablet 25 mg  25 mg Oral DAILY  venlafaxine-SR (EFFEXOR-XR) capsule 150 mg  150 mg Oral QPM  
 calcium-vitamin D (OS-MICHAEL) 500 mg-200 unit tablet  1 Tab Oral DAILY  potassium chloride SR (KLOR-CON 10) tablet 10 mEq  10 mEq Oral DAILY  atorvastatin (LIPITOR) tablet 10 mg  10 mg Oral QPM  
 therapeutic multivitamin (THERAGRAN) tablet 1 Tab  1 Tab Oral DAILY  cyclobenzaprine (FLEXERIL) tablet 5 mg  5 mg Oral TID WITH MEALS  
 acetaminophen (TYLENOL) tablet 1,000 mg  1,000 mg Oral Q6H PRN  
 cyanocobalamin (VITAMIN B12) tablet 1,000 mcg  1,000 mcg Oral DAILY  sodium chloride (NS) flush 5-40 mL  5-40 mL IntraVENous Q8H  
 sodium chloride (NS) flush 5-40 mL  5-40 mL IntraVENous PRN  
 HYDROcodone-acetaminophen (NORCO) 5-325 mg per tablet 1 Tab  1 Tab Oral Q4H PRN  
 morphine injection 2 mg  2 mg IntraVENous Q4H PRN  
 ondansetron (ZOFRAN) injection 4 mg  4 mg IntraVENous Q4H PRN  
 
______________________________________________________________________ EXPECTED LENGTH OF STAY: 6d 2h 
ACTUAL LENGTH OF STAY:          9 
 
            
Mars Caicedo MD

## 2019-02-20 NOTE — PROGRESS NOTES
Participated in [facility] rounds this morning. ID following;. Awaiting culture results. Patient to discharge home with family when medically cleared.   CM will continue to follow.  
MELANIE Valerio,CRM

## 2019-02-20 NOTE — PROGRESS NOTES
Infectious Diseases Progress Note Antibiotic Summary: 
Vancomycin  --  (final dose 7 AM) Zosyn   --  (final dose 11 AM) Subjective:  
 
Her pain seems better. Objective:  
 
Vitals:  
Visit Vitals /88 (BP 1 Location: Right arm, BP Patient Position: At rest) Pulse 93 Temp 98.5 °F (36.9 °C) Resp 18 Ht 5' 4.5\" (1.638 m) Wt 79.4 kg (175 lb) LMP  (LMP Unknown) Comment: LMP unknown SpO2 98% BMI 29.57 kg/m² Tmax:  Temp (24hrs), Av.5 °F (36.9 °C), Min:98.3 °F (36.8 °C), Max:98.8 °F (37.1 °C) Exam:  General appearance: alert, no distress Lungs: clear to auscultation bilaterally Heart: regular rate and rhythm Abdomen: soft, non-tender. Bowel sounds normal.  
Neurologic: LE strength and sensation normal 
 
IV Lines: peripheral 
 
Labs:   
Recent Labs  
  19 
0213 BUN 16  
CREA 0.82  
 
 
BLOOD CULTURES: 
  = NGSF Aspiration & biopsy L5-S1 on 2019: 
 Gram stain = rare WBCs; no organisms seen (initial report of occasional GPC in clusters was an error) Aerobic culture = NGSF Anaerobic culture = pending Fungal culture = pending AFB smear = pending AFB culture = pending Assessment: 1. L5 - S1 discitis, vertebral osteomyelitis, and epidural abscess -- etiologic microorganism not known: Await culture update tomorrow 
  
2. Right breast cancer in ~  treated with lumpectomy and RT 
  
3. HTN 
  
4. Hyperlipidemia Plan: 1. Update cultures tomorrow Noris Schaffer MD

## 2019-02-21 ENCOUNTER — APPOINTMENT (OUTPATIENT)
Dept: MRI IMAGING | Age: 64
DRG: 477 | End: 2019-02-21
Attending: INTERNAL MEDICINE
Payer: COMMERCIAL

## 2019-02-21 LAB
ANION GAP SERPL CALC-SCNC: 7 MMOL/L (ref 5–15)
BASOPHILS # BLD: 0 K/UL (ref 0–0.1)
BASOPHILS NFR BLD: 0 % (ref 0–1)
BUN SERPL-MCNC: 18 MG/DL (ref 6–20)
BUN/CREAT SERPL: 21 (ref 12–20)
CALCIUM SERPL-MCNC: 8.6 MG/DL (ref 8.5–10.1)
CHLORIDE SERPL-SCNC: 103 MMOL/L (ref 97–108)
CO2 SERPL-SCNC: 27 MMOL/L (ref 21–32)
CREAT SERPL-MCNC: 0.84 MG/DL (ref 0.55–1.02)
CRP SERPL-MCNC: 0.45 MG/DL (ref 0–0.6)
DIFFERENTIAL METHOD BLD: NORMAL
EOSINOPHIL # BLD: 0.1 K/UL (ref 0–0.4)
EOSINOPHIL NFR BLD: 1 % (ref 0–7)
ERYTHROCYTE [DISTWIDTH] IN BLOOD BY AUTOMATED COUNT: 14.4 % (ref 11.5–14.5)
ERYTHROCYTE [SEDIMENTATION RATE] IN BLOOD: 41 MM/HR (ref 0–30)
GLUCOSE SERPL-MCNC: 99 MG/DL (ref 65–100)
HCT VFR BLD AUTO: 36.6 % (ref 35–47)
HGB BLD-MCNC: 11.8 G/DL (ref 11.5–16)
IMM GRANULOCYTES # BLD AUTO: 0 K/UL (ref 0–0.04)
IMM GRANULOCYTES NFR BLD AUTO: 0 % (ref 0–0.5)
LYMPHOCYTES # BLD: 2.5 K/UL (ref 0.8–3.5)
LYMPHOCYTES NFR BLD: 44 % (ref 12–49)
MCH RBC QN AUTO: 29.6 PG (ref 26–34)
MCHC RBC AUTO-ENTMCNC: 32.2 G/DL (ref 30–36.5)
MCV RBC AUTO: 91.7 FL (ref 80–99)
MONOCYTES # BLD: 0.6 K/UL (ref 0–1)
MONOCYTES NFR BLD: 10 % (ref 5–13)
NEUTS SEG # BLD: 2.5 K/UL (ref 1.8–8)
NEUTS SEG NFR BLD: 44 % (ref 32–75)
NRBC # BLD: 0 K/UL (ref 0–0.01)
NRBC BLD-RTO: 0 PER 100 WBC
PLATELET # BLD AUTO: 179 K/UL (ref 150–400)
PMV BLD AUTO: 9.7 FL (ref 8.9–12.9)
POTASSIUM SERPL-SCNC: 3.4 MMOL/L (ref 3.5–5.1)
RBC # BLD AUTO: 3.99 M/UL (ref 3.8–5.2)
SODIUM SERPL-SCNC: 137 MMOL/L (ref 136–145)
WBC # BLD AUTO: 5.7 K/UL (ref 3.6–11)

## 2019-02-21 PROCEDURE — 36415 COLL VENOUS BLD VENIPUNCTURE: CPT

## 2019-02-21 PROCEDURE — 85025 COMPLETE CBC W/AUTO DIFF WBC: CPT

## 2019-02-21 PROCEDURE — 74011250637 HC RX REV CODE- 250/637: Performed by: INTERNAL MEDICINE

## 2019-02-21 PROCEDURE — 86140 C-REACTIVE PROTEIN: CPT

## 2019-02-21 PROCEDURE — 72158 MRI LUMBAR SPINE W/O & W/DYE: CPT

## 2019-02-21 PROCEDURE — 80048 BASIC METABOLIC PNL TOTAL CA: CPT

## 2019-02-21 PROCEDURE — 65270000032 HC RM SEMIPRIVATE

## 2019-02-21 PROCEDURE — 74011636320 HC RX REV CODE- 636/320: Performed by: HOSPITALIST

## 2019-02-21 PROCEDURE — 85652 RBC SED RATE AUTOMATED: CPT

## 2019-02-21 PROCEDURE — 94760 N-INVAS EAR/PLS OXIMETRY 1: CPT

## 2019-02-21 PROCEDURE — A9575 INJ GADOTERATE MEGLUMI 0.1ML: HCPCS | Performed by: HOSPITALIST

## 2019-02-21 RX ADMIN — Medication 10 ML: at 22:09

## 2019-02-21 RX ADMIN — CYCLOBENZAPRINE 5 MG: 10 TABLET, FILM COATED ORAL at 12:19

## 2019-02-21 RX ADMIN — HYDROCHLOROTHIAZIDE 25 MG: 25 TABLET ORAL at 10:41

## 2019-02-21 RX ADMIN — GADOTERATE MEGLUMINE 15 ML: 376.9 INJECTION INTRAVENOUS at 17:05

## 2019-02-21 RX ADMIN — THERA TABS 1 TABLET: TAB at 10:41

## 2019-02-21 RX ADMIN — CYANOCOBALAMIN TAB 500 MCG 1000 MCG: 500 TAB at 10:41

## 2019-02-21 RX ADMIN — VENLAFAXINE HYDROCHLORIDE 150 MG: 150 CAPSULE, EXTENDED RELEASE ORAL at 17:52

## 2019-02-21 RX ADMIN — ATORVASTATIN CALCIUM 10 MG: 20 TABLET, FILM COATED ORAL at 17:52

## 2019-02-21 RX ADMIN — CYCLOBENZAPRINE 5 MG: 10 TABLET, FILM COATED ORAL at 06:55

## 2019-02-21 RX ADMIN — OYSTER SHELL CALCIUM WITH VITAMIN D 1 TABLET: 500; 200 TABLET, FILM COATED ORAL at 10:41

## 2019-02-21 RX ADMIN — Medication 10 ML: at 15:46

## 2019-02-21 RX ADMIN — POTASSIUM CHLORIDE 10 MEQ: 750 TABLET, FILM COATED, EXTENDED RELEASE ORAL at 10:41

## 2019-02-21 RX ADMIN — Medication 10 ML: at 04:24

## 2019-02-21 RX ADMIN — CYCLOBENZAPRINE 5 MG: 10 TABLET, FILM COATED ORAL at 17:53

## 2019-02-21 NOTE — PROGRESS NOTES
Hospitalist Progress Note Wei Vargas MD 
Answering service: [phone] OR [phone] from in house phone Date of Service:  2019 NAME:  Kanu Hernandez :  1955 MRN:  [id number] Admission Summary:  
Kanu Hernandez is a 58 y.o. female with PMH of breast ca s/p lumpectomy and radiation, htn, hld who presents with back pain. Has been having symptoms for the past 2 weeks. Met with PCP and underwent outpt MRI last Thursday found remarkable for L5-S1 vertebral osteomyelitis with epidural abscess and severe bilateral canal foraminal stenosis. Interval history / Subjective:  
Seen for f/u of OM She is denying any complaints today/   
 
Assessment & Plan: Suspected L5-S1 osteomyelitis with epidural abscess 
- NSGY following, no acute surgical needs, will need o/p FU - now off Vanc/Zosyn, and monitor for SIRS response. - Flexeril scheduled. - Continue serial physical exams  
- PO norco for pain, IV morphine for breakthrough -  IR guided biopsy - Cultures pending - Need recommendations regarding antibtioics prior to discharge, ID recomming if Open biopsy possible - Called to neurosurgery office to discuss,awaiting call back Hypokalemia - replete as needed, continue to monitor HLD - home statin H/o breast cancer DCIS Depression post menopausal  - home effexor Code status: FULL 
DVT prophylaxis: SCD Care Plan discussed with: Patient/Family Disposition: Home w/Family once cleared with ID. patient ambulatory on admission. Hospital Problems  Date Reviewed: 2019 Codes Class Noted POA Epidural abscess ICD-10-CM: G06.2 ICD-9-CM: 324.9  2019 Unknown Review of Systems: A comprehensive review of systems was negative except for that written in the HPI. Vital Signs:  
 Last 24hrs VS reviewed since prior progress note. Most recent are: 
Visit Vitals /84 (BP 1 Location: Right arm, BP Patient Position: At rest) Pulse (!) 104 Temp 98.5 °F (36.9 °C) Resp 16 Ht 5' 4.5\" (1.638 m) Wt 79.4 kg (175 lb) SpO2 98% BMI 29.57 kg/m² No intake or output data in the 24 hours ending 02/21/19 1155 Physical Examination:  
 
 
     
Constitutional:  No acute distress, cooperative, pleasant   
ENT:  Oral mucous moist, oropharynx benign. Resp:  CTA bilaterally. No wheezing/rhonchi/rales. No accessory muscle use CV:  Regular rhythm, normal rate, no murmurs, gallops, rubs GI:  Soft, non distended, non tender. normoactive bowel sounds, no hepatosplenomegaly Musculoskeletal:  No edema, warm, 2+ pulses throughout, no spinal tenderness. Neurologic:  Moves all extremities. AAOx3, CN II-XII reviewed Skin:  Good turgor, no rashes or ulcers Data Review: All imaging and laboratory data reviewed. Labs:  
 
Recent Labs  
  02/21/19 0219 02/20/19 
4730 WBC 5.7 5.6 HGB 11.8 11.7 HCT 36.6 36.5  168 Recent Labs  
  02/21/19 
0219 02/20/19 
6566 02/19/19 
1259  139 138  
K 3.4* 3.5 3.3*  
 103 102 CO2 27 28 29 BUN 18 17 16 CREA 0.84 0.86 0.82 GLU 99 96 84 CA 8.6 8.9 8.8 MG  --   --  2.1 PHOS  --   --  4.7 No results for input(s): SGOT, GPT, ALT, AP, TBIL, TBILI, TP, ALB, GLOB, GGT, AML, LPSE in the last 72 hours. No lab exists for component: AMYP, HLPSE No results for input(s): INR, PTP, APTT in the last 72 hours. No lab exists for component: INREXT, INREXT No results for input(s): FE, TIBC, PSAT, FERR in the last 72 hours. No results found for: FOL, RBCF No results for input(s): PH, PCO2, PO2 in the last 72 hours. No results for input(s): CPK, CKNDX, TROIQ in the last 72 hours. No lab exists for component: CPKMB No results found for: CHOL, CHOLX, CHLST, CHOLV, HDL, LDL, LDLC, DLDLP, TGLX, TRIGL, TRIGP, CHHD, CHHDX No results found for: Aubree Meyers No results found for: COLOR, APPRN, SPGRU, REFSG, ALEXA, PROTU, GLUCU, KETU, BILU, UROU, RANDY, LEUKU, GLUKE, EPSU, BACTU, WBCU, RBCU, CASTS, UCRY Medications Reviewed:  
 
Current Facility-Administered Medications Medication Dose Route Frequency  hydroCHLOROthiazide (HYDRODIURIL) tablet 25 mg  25 mg Oral DAILY  venlafaxine-SR (EFFEXOR-XR) capsule 150 mg  150 mg Oral QPM  
 calcium-vitamin D (OS-MICHAEL) 500 mg-200 unit tablet  1 Tab Oral DAILY  potassium chloride SR (KLOR-CON 10) tablet 10 mEq  10 mEq Oral DAILY  atorvastatin (LIPITOR) tablet 10 mg  10 mg Oral QPM  
 therapeutic multivitamin (THERAGRAN) tablet 1 Tab  1 Tab Oral DAILY  cyclobenzaprine (FLEXERIL) tablet 5 mg  5 mg Oral TID WITH MEALS  
 acetaminophen (TYLENOL) tablet 1,000 mg  1,000 mg Oral Q6H PRN  
 cyanocobalamin (VITAMIN B12) tablet 1,000 mcg  1,000 mcg Oral DAILY  sodium chloride (NS) flush 5-40 mL  5-40 mL IntraVENous Q8H  
 sodium chloride (NS) flush 5-40 mL  5-40 mL IntraVENous PRN  
 HYDROcodone-acetaminophen (NORCO) 5-325 mg per tablet 1 Tab  1 Tab Oral Q4H PRN  
 morphine injection 2 mg  2 mg IntraVENous Q4H PRN  
 ondansetron (ZOFRAN) injection 4 mg  4 mg IntraVENous Q4H PRN  
 
______________________________________________________________________ EXPECTED LENGTH OF STAY: 6d 2h 
ACTUAL LENGTH OF STAY:          10 
 
            
Glorya Nageotte, MD

## 2019-02-21 NOTE — PROGRESS NOTES
Problem: Falls - Risk of 
Goal: *Absence of Falls Document Nargis Quick Fall Risk and appropriate interventions in the flowsheet. Outcome: Progressing Towards Goal 
Fall Risk Interventions: 
Mobility Interventions: Communicate number of staff needed for ambulation/transfer Medication Interventions: Evaluate medications/consider consulting pharmacy History of Falls Interventions: Door open when patient unattended

## 2019-02-21 NOTE — PROGRESS NOTES
Infectious Diseases Progress Note Antibiotic Summary: 
Vancomycin  --  (final dose 7 AM) Zosyn   --  (final dose 11 AM) Subjective:  
 
Before admission, her LBP was up to \"10/10\" in severity. She says the pain is now \"2-3/10\" in severity. Pain still exacerbated by movement. Objective:  
 
Vitals:  
Visit Vitals BP (!) 137/96 (BP Patient Position: Standing) Pulse 90 Temp 98.9 °F (37.2 °C) Resp 16 Ht 5' 4.5\" (1.638 m) Wt 79.4 kg (175 lb) LMP  (LMP Unknown) Comment: LMP unknown SpO2 97% BMI 29.57 kg/m² Tmax:  Temp (24hrs), Av.5 °F (36.9 °C), Min:98.1 °F (36.7 °C), Max:98.9 °F (37.2 °C) Exam:  General appearance: alert, no distress Lungs: clear to auscultation bilaterally Heart: regular rate and rhythm Abdomen: soft, non-tender. Bowel sounds normal.  
Neurologic: LE strength and sensation normal 
 
IV Lines: peripheral 
 
Labs:   
Recent Labs  
  19 
0307 19 
7942 WBC 5.6  --   
HGB 11.7  --   
  --   
BUN 17 16 CREA 0.86 0.82  
 
 
BLOOD CULTURES: 
  = NGSF Aspiration & biopsy L5-S1 on 2019: 
 Gram stain = rare WBCs; no organisms seen (initial report of occasional GPC in clusters was an error) Aerobic culture = NGSF Anaerobic culture = NGSF Fungal culture = pending AFB smear = pending AFB culture = pending Assessment: 1. Presumed L5 - S1 discitis, vertebral osteomyelitis, and epidural abscess -- etiologic microorganism not known: Cultures negative so far. Her symptoms are significantly better. If cultures are still negative tomorrow, we need to decide on option: A. Empiric IV antibiotics x >=6weeks B. Open surgical biopsy with cultures and path C. Observe off antibiotics with repeat MRI in 1-2 weeks She wants to go home but I again explained the serious nature of spinal infection and risks associated with progressive infection 
  
 2. Right breast cancer in ~ 2004 treated with lumpectomy and RT 
  
3. HTN 
  
4. Hyperlipidemia Plan: 1. Update cultures tomorrow -- then discuss with Dr. Harriett Johnson 2.  ESR and CRP in AM 
 
 
Tamara De Luna MD

## 2019-02-22 PROCEDURE — 74011000258 HC RX REV CODE- 258: Performed by: INTERNAL MEDICINE

## 2019-02-22 PROCEDURE — 77030018719 HC DRSG PTCH ANTIMIC J&J -A

## 2019-02-22 PROCEDURE — 36569 INSJ PICC 5 YR+ W/O IMAGING: CPT | Performed by: HOSPITALIST

## 2019-02-22 PROCEDURE — 74011250636 HC RX REV CODE- 250/636: Performed by: INTERNAL MEDICINE

## 2019-02-22 PROCEDURE — 77030020847 HC STATLOK BARD -A

## 2019-02-22 PROCEDURE — 76937 US GUIDE VASCULAR ACCESS: CPT

## 2019-02-22 PROCEDURE — 02HV33Z INSERTION OF INFUSION DEVICE INTO SUPERIOR VENA CAVA, PERCUTANEOUS APPROACH: ICD-10-PCS | Performed by: HOSPITALIST

## 2019-02-22 PROCEDURE — 74011250637 HC RX REV CODE- 250/637: Performed by: INTERNAL MEDICINE

## 2019-02-22 PROCEDURE — C1751 CATH, INF, PER/CENT/MIDLINE: HCPCS

## 2019-02-22 PROCEDURE — 77030020365 HC SOL INJ SOD CL 0.9% 50ML

## 2019-02-22 PROCEDURE — 65270000032 HC RM SEMIPRIVATE

## 2019-02-22 RX ORDER — VANCOMYCIN HYDROCHLORIDE
1250 EVERY 12 HOURS
Status: DISCONTINUED | OUTPATIENT
Start: 2019-02-23 | End: 2019-02-25 | Stop reason: HOSPADM

## 2019-02-22 RX ORDER — VANCOMYCIN 1.75 GRAM/500 ML IN 0.9 % SODIUM CHLORIDE INTRAVENOUS
1750 ONCE
Status: COMPLETED | OUTPATIENT
Start: 2019-02-22 | End: 2019-02-22

## 2019-02-22 RX ORDER — HYDROCODONE BITARTRATE AND ACETAMINOPHEN 5; 325 MG/1; MG/1
1 TABLET ORAL
Qty: 15 TAB | Refills: 0 | Status: SHIPPED | OUTPATIENT
Start: 2019-02-22

## 2019-02-22 RX ADMIN — ATORVASTATIN CALCIUM 10 MG: 20 TABLET, FILM COATED ORAL at 16:19

## 2019-02-22 RX ADMIN — CEFEPIME HYDROCHLORIDE 2 G: 2 INJECTION, POWDER, FOR SOLUTION INTRAVENOUS at 16:07

## 2019-02-22 RX ADMIN — CYCLOBENZAPRINE 5 MG: 10 TABLET, FILM COATED ORAL at 12:47

## 2019-02-22 RX ADMIN — CYCLOBENZAPRINE 5 MG: 10 TABLET, FILM COATED ORAL at 16:20

## 2019-02-22 RX ADMIN — THERA TABS 1 TABLET: TAB at 09:18

## 2019-02-22 RX ADMIN — CYCLOBENZAPRINE 5 MG: 10 TABLET, FILM COATED ORAL at 07:06

## 2019-02-22 RX ADMIN — OYSTER SHELL CALCIUM WITH VITAMIN D 1 TABLET: 500; 200 TABLET, FILM COATED ORAL at 09:18

## 2019-02-22 RX ADMIN — Medication 10 ML: at 05:54

## 2019-02-22 RX ADMIN — CYANOCOBALAMIN TAB 500 MCG 1000 MCG: 500 TAB at 09:18

## 2019-02-22 RX ADMIN — VANCOMYCIN HYDROCHLORIDE 1750 MG: 10 INJECTION, POWDER, LYOPHILIZED, FOR SOLUTION INTRAVENOUS at 17:03

## 2019-02-22 RX ADMIN — VENLAFAXINE HYDROCHLORIDE 150 MG: 150 CAPSULE, EXTENDED RELEASE ORAL at 16:20

## 2019-02-22 RX ADMIN — HYDROCHLOROTHIAZIDE 25 MG: 25 TABLET ORAL at 09:17

## 2019-02-22 RX ADMIN — POTASSIUM CHLORIDE 10 MEQ: 750 TABLET, FILM COATED, EXTENDED RELEASE ORAL at 09:18

## 2019-02-22 NOTE — PROGRESS NOTES
Problem: Falls - Risk of 
Goal: *Absence of Falls Document Ross Moe Fall Risk and appropriate interventions in the flowsheet. Outcome: Progressing Towards Goal 
Fall Risk Interventions: 
Mobility Interventions: Communicate number of staff needed for ambulation/transfer, Utilize walker, cane, or other assistive device Medication Interventions: Evaluate medications/consider consulting pharmacy, Teach patient to arise slowly History of Falls Interventions: Evaluate medications/consider consulting pharmacy

## 2019-02-22 NOTE — PROCEDURES
PICC Placement Note    PRE-PROCEDURE VERIFICATION  Correct Procedure: yes  Correct Site:  yes  Temperature: Temp: 98.8 °F (37.1 °C), Temperature Source: Temp Source: Oral  Recent Labs     02/21/19  0219   BUN 18   CREA 0.84      WBC 5.7       Allergies: Patient has no known allergies. Education materials, including PICC Booklet and written information regarding central catheter related bloodstream infection and prevention given to patient. See Patient Education activity for further details. PROCEDURE DETAIL  A double lumen power injectable PICC line was started for Long Term IV Antibiotic Therapy. The following documentation is in addition to the PICC properties in the lines/airways flowsheet :  Lot #: [id number]  Xylocaine 1% used intradermally:  yes  Total Catheter Length:  37 (cm)  External Catheter Length: 0 (cm)  Vein Selection for PICC: right basilic  Central Line Bundle followed: yes  Complication Related to Insertion: none    The placement was verified by ECG technology. The PICC is on the right side and the tip overlies the superior vena cava. ECG verification documentation is on the patient's paper chart. Line is okay to use. Report to [facility].     Mayra Souza, LETTYN, RN, [facility]-BC   Vascular Access Team

## 2019-02-22 NOTE — PROGRESS NOTES
Infectious Diseases Progress Note Antibiotic Summary: 
Vancomycin  --  (final dose 7 AM) Zosyn   --  (final dose 11 AM) Subjective: She is comfortable at present. Back pain is improved Objective:  
 
Vitals:  
Visit Vitals /82 (BP 1 Location: Right arm, BP Patient Position: At rest) Pulse (!) 105 Temp 98.3 °F (36.8 °C) Resp 16 Ht 5' 4.5\" (1.638 m) Wt 79.4 kg (175 lb) LMP  (LMP Unknown) Comment: LMP unknown SpO2 99% BMI 29.57 kg/m² Tmax:  Temp (24hrs), Av.3 °F (36.8 °C), Min:98.1 °F (36.7 °C), Max:98.5 °F (36.9 °C) Exam:  General appearance: alert, no distress Lungs: clear to auscultation bilaterally Heart: regular rate and rhythm Abdomen: soft, non-tender. Bowel sounds normal.  
Neurologic: LE strength and sensation normal 
 
IV Lines: peripheral 
 
Labs:   
Recent Labs  
  19 
0219 19 
0307 19 
4730 WBC 5.7 5.6  --   
HGB 11.8 11.7  --   
 168  --   
BUN 18 17 16 CREA 0.84 0.86 0.82  
 
 
BLOOD CULTURES: 
  = NGSF Aspiration & biopsy L5-S1 on 2019: 
 Gram stain = rare WBCs; no organisms seen (initial report of occasional GPC in clusters was an error) Aerobic culture = NGSF Anaerobic culture = NGSF Fungal culture = pending AFB smear = pending AFB culture = pending Repeat MRI of the LSS on  = pending ESR on  = 41 mm./h (0-30 mm/h) ESR on  = 41 mm/h CRP on  = 0.45 mg/dL (0.00-0.60 mg/dL) Assessment: 1. Presumed L5 - S1 discitis, vertebral osteomyelitis, and epidural abscess -- etiologic microorganism not known: Cultures negative so far. Her symptoms are significantly better. Options include: A. Empiric IV antibiotics x >=6weeks -- a possible empiric regimen would be Vancomycin 1000 mg IV q 12 hours Cefepime 2 GM IV q 12 hours B. Open surgical biopsy with cultures and path C. Observe off antibiotics with repeat MRI in 1-2 weeks Discussed with Dr. Abimbola Alfredo today. We will repeat the MRI and then make further decisions 
  
2. Right breast cancer in ~ 2004 treated with lumpectomy and RT 
  
3. HTN 
  
4. Hyperlipidemia Plan: 1. Await MRI and will then regroup with Dr. Abimbola Alfredo Discussed at length with the patient Ivy Whitfield MD

## 2019-02-22 NOTE — PROGRESS NOTES
Spoke with Dr. Adam Valencia MD. Patient will need approx 6 weeks of home antibiotics- Vancomycin and Cefepime. Met with patient at bedside. Patient is in agreement with plan. Sent referral to [employer] and requested cost of medications. Awaiting response. Will continue to follow. MELANIE Alicia,CRM Addendum 10:45AM  
[facility] accepted and covered home antibiotics at 100% with insurance provider. Sent referral to [facility] via [employer]; Awaiting response. Called Dr. Adam Valencia to request final home antibiotics. Awaiting for PICC Line placement. Will continue to follow. MELANIE Alicia,CRM Addendum 11:50AM  
Spoke with Dr. Adam Valencia MD and requested final home antibiotics. Per MD, Patient remains inpatient until early next week. Notified [facility] and [facility] of patient's discharge plan. Need home antibiotics orders prior to discharge. CM will continue to follow.

## 2019-02-22 NOTE — PROGRESS NOTES
Hospitalist Progress Note Gabbie Duncan MD 
Answering service: [phone] OR [phone] from in house phone Date of Service:  2019 NAME:  Tristan Lynn :  1955 MRN:  [id number] Admission Summary:  
Tristan Lynn is a 58 y.o. female with PMH of breast ca s/p lumpectomy and radiation, htn, hld who presents with back pain. Has been having symptoms for the past 2 weeks. Met with PCP and underwent outpt MRI last Thursday found remarkable for L5-S1 vertebral osteomyelitis with epidural abscess and severe bilateral canal foraminal stenosis. Interval history / Subjective:  
Seen for f/u of OM She is denying any complaints today Denies reactions to abx Assessment & Plan: Suspected L5-S1 osteomyelitis with epidural abscess 
- NSGY following, no acute surgical needs, will need o/p FU - now off Vanc/Zosyn, and monitor for SIRS response. - Flexeril scheduled. - Continue serial physical exams  
- PO norco for pain, IV morphine for breakthrough -  IR guided biopsy - Cultures pending - Need recommendations regarding antibtioics prior to discharge, ID recomming if Open biopsy possible per neurosurgery 
-- MRI performed - showing stable abscess, D/W Dr Donnie Ragsdale- no plans for open biopsy per neurosurgery. Watch status on IV abx over weekend Hypokalemia - replete as needed, continue to monitor HLD - home statin H/o breast cancer DCIS Depression post menopausal  - home effexor Code status: FULL 
DVT prophylaxis: SCD Care Plan discussed with: Patient/Family Disposition: Home w/Family once cleared with ID. patient ambulatory on admission. Hospital Problems  Date Reviewed: 2019 Codes Class Noted POA Epidural abscess ICD-10-CM: G06.2 ICD-9-CM: 324.9  2019 Unknown Review of Systems: A comprehensive review of systems was negative except for that written in the HPI. Vital Signs:  
 Last 24hrs VS reviewed since prior progress note. Most recent are: 
Visit Vitals /83 (BP 1 Location: Right arm, BP Patient Position: At rest) Pulse 96 Temp 98.2 °F (36.8 °C) Resp 17 Ht 5' 4.5\" (1.638 m) Wt 79.4 kg (175 lb) SpO2 99% BMI 29.57 kg/m² No intake or output data in the 24 hours ending 02/22/19 0750 Physical Examination:  
 
 
     
Constitutional:  No acute distress, cooperative, pleasant   
ENT:  Oral mucous moist, oropharynx benign. Resp:  CTA bilaterally. No wheezing/rhonchi/rales. No accessory muscle use CV:  Regular rhythm, normal rate, no murmurs, gallops, rubs GI:  Soft, non distended, non tender. normoactive bowel sounds, no hepatosplenomegaly Musculoskeletal:  No edema, warm, 2+ pulses throughout, no spinal tenderness. Neurologic:  Moves all extremities. AAOx3, CN II-XII reviewed Skin:  Good turgor, no rashes or ulcers Data Review: All imaging and laboratory data reviewed. Labs:  
 
Recent Labs  
  02/21/19 
0219 02/20/19 
2813 WBC 5.7 5.6 HGB 11.8 11.7 HCT 36.6 36.5  168 Recent Labs  
  02/21/19 
0219 02/20/19 
0953  139  
K 3.4* 3.5  103 CO2 27 28 BUN 18 17 CREA 0.84 0.86 GLU 99 96  
CA 8.6 8.9 No results for input(s): SGOT, GPT, ALT, AP, TBIL, TBILI, TP, ALB, GLOB, GGT, AML, LPSE in the last 72 hours. No lab exists for component: AMYP, HLPSE No results for input(s): INR, PTP, APTT in the last 72 hours. No lab exists for component: INREXT, INREXT No results for input(s): FE, TIBC, PSAT, FERR in the last 72 hours. No results found for: FOL, RBCF No results for input(s): PH, PCO2, PO2 in the last 72 hours. No results for input(s): CPK, CKNDX, TROIQ in the last 72 hours. No lab exists for component: CPKMB No results found for: CHOL, CHOLX, CHLST, CHOLV, HDL, LDL, LDLC, DLDLP, TGLX, TRIGL, TRIGP, CHHD, CHHDX No results found for: Nelly Whitlock No results found for: COLOR, APPRN, SPGRU, REFSG, ALEXA, PROTU, GLUCU, KETU, BILU, UROU, RANDY, LEUKU, GLUKE, EPSU, BACTU, WBCU, RBCU, CASTS, UCRY Medications Reviewed:  
 
Current Facility-Administered Medications Medication Dose Route Frequency  hydroCHLOROthiazide (HYDRODIURIL) tablet 25 mg  25 mg Oral DAILY  venlafaxine-SR (EFFEXOR-XR) capsule 150 mg  150 mg Oral QPM  
 calcium-vitamin D (OS-MICHAEL) 500 mg-200 unit tablet  1 Tab Oral DAILY  potassium chloride SR (KLOR-CON 10) tablet 10 mEq  10 mEq Oral DAILY  atorvastatin (LIPITOR) tablet 10 mg  10 mg Oral QPM  
 therapeutic multivitamin (THERAGRAN) tablet 1 Tab  1 Tab Oral DAILY  cyclobenzaprine (FLEXERIL) tablet 5 mg  5 mg Oral TID WITH MEALS  
 acetaminophen (TYLENOL) tablet 1,000 mg  1,000 mg Oral Q6H PRN  
 cyanocobalamin (VITAMIN B12) tablet 1,000 mcg  1,000 mcg Oral DAILY  sodium chloride (NS) flush 5-40 mL  5-40 mL IntraVENous Q8H  
 sodium chloride (NS) flush 5-40 mL  5-40 mL IntraVENous PRN  
 HYDROcodone-acetaminophen (NORCO) 5-325 mg per tablet 1 Tab  1 Tab Oral Q4H PRN  
 morphine injection 2 mg  2 mg IntraVENous Q4H PRN  
 ondansetron (ZOFRAN) injection 4 mg  4 mg IntraVENous Q4H PRN  
 
______________________________________________________________________ EXPECTED LENGTH OF STAY: 6d 2h 
ACTUAL LENGTH OF STAY:          11 
 
            
Delfino Pablo MD

## 2019-02-22 NOTE — PROGRESS NOTES
Pharmacist Note - Vancomycin Dosing Consult provided for this 58 y.o. female for indication of  Presumed L5 - S1 discitis, vertebral osteomyelitis, and epidural abscess  
-Pt previously on vancomycin from  -  this admission Antibiotic regimen(s): cefepime Recent Labs  
  19 
0219 19 
0129 WBC 5.7 5.6 CREA 0.84 0.86 BUN 18 17 Frequency of BMP: daily Height: 163.8 cm Weight: 79.4 kg Est CrCl: 69.7 ml/min Temp (24hrs), Av.4 °F (36.9 °C), Min:98.2 °F (36.8 °C), Max:98.8 °F (37.1 °C) Cultures: 
 blood - NG, final 
 body fluid - pending  fungus - pending  AFB- negative Goal trough = 15 - 20 mcg/mL Therapy will be initiated with a loading dose of 1750 mg IV x 1 to be followed by a maintenance dose of 1250 mg IV every 12 hours, as the previous regimen of 1000mg Q12h yielded a sub-therapeutic trough level. Pharmacy to follow patient daily and order levels / make dose adjustments as appropriate.

## 2019-02-23 PROCEDURE — 74011250636 HC RX REV CODE- 250/636: Performed by: INTERNAL MEDICINE

## 2019-02-23 PROCEDURE — 74011250637 HC RX REV CODE- 250/637: Performed by: INTERNAL MEDICINE

## 2019-02-23 PROCEDURE — 94760 N-INVAS EAR/PLS OXIMETRY 1: CPT

## 2019-02-23 PROCEDURE — 65270000032 HC RM SEMIPRIVATE

## 2019-02-23 PROCEDURE — 74011000258 HC RX REV CODE- 258: Performed by: INTERNAL MEDICINE

## 2019-02-23 RX ADMIN — Medication 10 ML: at 11:57

## 2019-02-23 RX ADMIN — POTASSIUM CHLORIDE 10 MEQ: 750 TABLET, FILM COATED, EXTENDED RELEASE ORAL at 08:23

## 2019-02-23 RX ADMIN — CYANOCOBALAMIN TAB 500 MCG 1000 MCG: 500 TAB at 08:23

## 2019-02-23 RX ADMIN — CYCLOBENZAPRINE 5 MG: 10 TABLET, FILM COATED ORAL at 16:23

## 2019-02-23 RX ADMIN — CYCLOBENZAPRINE 5 MG: 10 TABLET, FILM COATED ORAL at 07:34

## 2019-02-23 RX ADMIN — Medication 10 ML: at 04:01

## 2019-02-23 RX ADMIN — CEFEPIME HYDROCHLORIDE 2 G: 2 INJECTION, POWDER, FOR SOLUTION INTRAVENOUS at 15:21

## 2019-02-23 RX ADMIN — Medication 10 ML: at 21:13

## 2019-02-23 RX ADMIN — CYCLOBENZAPRINE 5 MG: 10 TABLET, FILM COATED ORAL at 11:56

## 2019-02-23 RX ADMIN — HYDROCHLOROTHIAZIDE 25 MG: 25 TABLET ORAL at 08:23

## 2019-02-23 RX ADMIN — VENLAFAXINE HYDROCHLORIDE 150 MG: 150 CAPSULE, EXTENDED RELEASE ORAL at 16:24

## 2019-02-23 RX ADMIN — OYSTER SHELL CALCIUM WITH VITAMIN D 1 TABLET: 500; 200 TABLET, FILM COATED ORAL at 08:23

## 2019-02-23 RX ADMIN — CEFEPIME HYDROCHLORIDE 2 G: 2 INJECTION, POWDER, FOR SOLUTION INTRAVENOUS at 04:00

## 2019-02-23 RX ADMIN — ATORVASTATIN CALCIUM 10 MG: 20 TABLET, FILM COATED ORAL at 16:23

## 2019-02-23 RX ADMIN — VANCOMYCIN HYDROCHLORIDE 1250 MG: 10 INJECTION, POWDER, LYOPHILIZED, FOR SOLUTION INTRAVENOUS at 04:01

## 2019-02-23 RX ADMIN — THERA TABS 1 TABLET: TAB at 08:23

## 2019-02-23 RX ADMIN — VANCOMYCIN HYDROCHLORIDE 1250 MG: 10 INJECTION, POWDER, LYOPHILIZED, FOR SOLUTION INTRAVENOUS at 16:18

## 2019-02-23 NOTE — PROGRESS NOTES
Hospitalist Progress Note Pauline Good MD 
Answering service: [phone] OR [phone] from in house phone Date of Service:  2019 NAME:  Keshawn Witt :  1955 MRN:  [id number] Admission Summary:  
Keshawn Witt is a 58 y.o. female with PMH of breast ca s/p lumpectomy and radiation, htn, hld who presents with back pain. Has been having symptoms for the past 2 weeks. Met with PCP and underwent outpt MRI last Thursday found remarkable for L5-S1 vertebral osteomyelitis with epidural abscess and severe bilateral canal foraminal stenosis. Interval history / Subjective:  
Seen for f/u of OM She is denying any complaints today Denies reactions to abx Assessment & Plan: Suspected L5-S1 osteomyelitis with epidural abscess 
- NSGY following, no acute surgical needs, will need o/p FU - now off Vanc/Zosyn, and monitor for SIRS response. - Flexeril scheduled. - Continue serial physical exams  
- PO norco for pain, IV morphine for breakthrough -  IR guided biopsy - Cultures pending - Need recommendations regarding antibtioics prior to discharge, ID recomming if Open biopsy possible per neurosurgery 
-- MRI performed - showing stable abscess, D/W Dr Ana Cheng- no plans for open biopsy per neurosurgery. Watch status on IV abx over weekend Hypokalemia - replete as needed, continue to monitor HLD - home statin H/o breast cancer DCIS Depression post menopausal  - home effexor Code status: FULL 
DVT prophylaxis: SCD Care Plan discussed with: Patient/Family Disposition: Home w/Family once cleared with ID. patient ambulatory on admission. Hospital Problems  Date Reviewed: 2019 Codes Class Noted POA Epidural abscess ICD-10-CM: G06.2 ICD-9-CM: 324.9  2019 Unknown Review of Systems: A comprehensive review of systems was negative except for that written in the HPI. Vital Signs:  
 Last 24hrs VS reviewed since prior progress note. Most recent are: 
Visit Vitals /80 Pulse (!) 109 Temp 98.6 °F (37 °C) Resp 18 Ht 5' 4.5\" (1.638 m) Wt 79.4 kg (175 lb) SpO2 98% BMI 29.57 kg/m² No intake or output data in the 24 hours ending 02/23/19 1337 Physical Examination:  
 
 
     
Constitutional:  No acute distress, cooperative, pleasant   
ENT:  Oral mucous moist, oropharynx benign. Resp:  CTA bilaterally. No wheezing/rhonchi/rales. No accessory muscle use CV:  Regular rhythm, normal rate, no murmurs, gallops, rubs GI:  Soft, non distended, non tender. normoactive bowel sounds, no hepatosplenomegaly Musculoskeletal:  No edema, warm, 2+ pulses throughout, no spinal tenderness. Neurologic:  Moves all extremities. AAOx3, CN II-XII reviewed Skin:  Good turgor, no rashes or ulcers Data Review: All imaging and laboratory data reviewed. Labs:  
 
Recent Labs  
  02/21/19 
0219 WBC 5.7 HGB 11.8 HCT 36.6  Recent Labs  
  02/21/19 
0219   
K 3.4*  
 CO2 27 BUN 18 CREA 0.84 GLU 99  
CA 8.6 No results for input(s): SGOT, GPT, ALT, AP, TBIL, TBILI, TP, ALB, GLOB, GGT, AML, LPSE in the last 72 hours. No lab exists for component: AMYP, HLPSE No results for input(s): INR, PTP, APTT in the last 72 hours. No lab exists for component: INREXT, INREXT No results for input(s): FE, TIBC, PSAT, FERR in the last 72 hours. No results found for: FOL, RBCF No results for input(s): PH, PCO2, PO2 in the last 72 hours. No results for input(s): CPK, CKNDX, TROIQ in the last 72 hours. No lab exists for component: CPKMB No results found for: CHOL, CHOLX, CHLST, CHOLV, HDL, LDL, LDLC, DLDLP, TGLX, TRIGL, TRIGP, CHHD, CHHDX No results found for: [facility] No results found for: COLOR, APPRN, SPGRU, REFSG, ALEXA, PROTU, GLUCU, KETU, [place], RANDY, LEUKU, GLUKE, EPSU, BACTU, WBCU, RBCU, CASTS, UCRY Medications Reviewed:  
 
Current Facility-Administered Medications Medication Dose Route Frequency  cefepime (MAXIPIME) 2 g in 0.9% sodium chloride (MBP/ADV) 100 mL  2 g IntraVENous Q12H  Vancomycin - Pharmacy to Dose   Other Rx Dosing/Monitoring  vancomycin (VANCOCIN) 1250 mg in  ml infusion  1,250 mg IntraVENous Q12H  hydroCHLOROthiazide (HYDRODIURIL) tablet 25 mg  25 mg Oral DAILY  venlafaxine-SR (EFFEXOR-XR) capsule 150 mg  150 mg Oral QPM  
 calcium-vitamin D (OS-MICHAEL) 500 mg-200 unit tablet  1 Tab Oral DAILY  potassium chloride SR (KLOR-CON 10) tablet 10 mEq  10 mEq Oral DAILY  atorvastatin (LIPITOR) tablet 10 mg  10 mg Oral QPM  
 therapeutic multivitamin (THERAGRAN) tablet 1 Tab  1 Tab Oral DAILY  cyclobenzaprine (FLEXERIL) tablet 5 mg  5 mg Oral TID WITH MEALS  
 acetaminophen (TYLENOL) tablet 1,000 mg  1,000 mg Oral Q6H PRN  
 cyanocobalamin (VITAMIN B12) tablet 1,000 mcg  1,000 mcg Oral DAILY  sodium chloride (NS) flush 5-40 mL  5-40 mL IntraVENous Q8H  
 sodium chloride (NS) flush 5-40 mL  5-40 mL IntraVENous PRN  
 HYDROcodone-acetaminophen (NORCO) 5-325 mg per tablet 1 Tab  1 Tab Oral Q4H PRN  
 morphine injection 2 mg  2 mg IntraVENous Q4H PRN  
 ondansetron (ZOFRAN) injection 4 mg  4 mg IntraVENous Q4H PRN  
 
______________________________________________________________________ EXPECTED LENGTH OF STAY: 6d 2h 
ACTUAL LENGTH OF STAY:          12 Daniele Kwon MD

## 2019-02-23 NOTE — PROGRESS NOTES
Back pain is markedly improved Neuro intact with good strength MRI does show severe stenosis at L3/4 and L4/5 Agree with treating infection first 
She may benefit from surgical decompression of L3/4 and L4/5 once infection has been treated

## 2019-02-23 NOTE — PROGRESS NOTES
Infectious Diseases Progress Note Antibiotic Summary: 
Vancomycin  --  (final dose 7 AM) Zosyn   --  (final dose 11 AM) Vancomycin  -- present Cefepime  -- present Subjective: No new symptoms. Back pain is stable Objective:  
 
Vitals:  
Visit Vitals /86 Pulse 100 Temp 98.6 °F (37 °C) Resp 17 Ht 5' 4.5\" (1.638 m) Wt 79.4 kg (175 lb) LMP  (LMP Unknown) Comment: LMP unknown SpO2 96% BMI 29.57 kg/m² Tmax:  Temp (24hrs), Av.5 °F (36.9 °C), Min:98.2 °F (36.8 °C), Max:98.8 °F (37.1 °C) Exam:  General appearance: alert, no distress Lungs: clear to auscultation bilaterally Heart: regular rate and rhythm Abdomen: soft, non-tender. Bowel sounds normal.  
Neurologic: LE strength and sensation normal 
 
IV Lines: Right PICC inserted  Labs:   
Recent Labs  
  19 
0219 19 
5534 WBC 5.7 5.6 HGB 11.8 11.7  168 BUN 18 17 CREA 0.84 0.86  
 
 
BLOOD CULTURES: 
  = NGSF Aspiration & biopsy L5-S1 on 2019: 
 Gram stain = rare WBCs; no organisms seen (initial report of occasional GPC in clusters was an error) Aerobic culture = NGSF Anaerobic culture = NGSF Fungal culture = pending AFB smear = pending AFB culture = pending Repeat MRI of the LSS on  = no change in the changes at L5-S1 and the epidural space ESR on  = 41 mm./h (0-30 mm/h) ESR on  = 41 mm/h CRP on  = 0.45 mg/dL (0.00-0.60 mg/dL) Assessment: 1. Presumed L5 - S1 discitis, vertebral osteomyelitis, and epidural abscess -- etiologic microorganism not known: Cultures negative so far. Her symptoms are significantly better. Options include: A. Empiric IV antibiotics x >=6weeks -- a possible empiric regimen would be Vancomycin 1000 mg IV q 12 hours Cefepime 2 GM IV q 12 hours B. Open surgical biopsy with cultures and path C. Observe off antibiotics with repeat MRI in 1-2 weeks Discussed with Dr. Lelia Griffith again today. Surgery does not seem indicated at this time. Discussed with the patient. We will begin Vanc + Cefepime. Reviewed possibility that antibiotics may fail to cure the infection. We have no \"test of cure\" at the end of RX. Will need to watch for antibiotic side effects and toxicities including Vanc associated nephrotoxicity, ototoxicity, vestibular nerve toxicity, etc 
  
2. Right breast cancer in ~ 2004 treated with lumpectomy and RT 
  
3. HTN 
  
4. Hyperlipidemia Plan: 1. Begin Vanc + Cefepime 2. Anticipate home IV antibiotics once Vanc dose is stabilized Discussed with the patient. I will check her on Monday. Call for problems over the weekend.  
 
Pearletha Koyanagi., MD

## 2019-02-23 NOTE — PROGRESS NOTES
Problem: Falls - Risk of 
Goal: *Absence of Falls Document Nargis Ram Fall Risk and appropriate interventions in the flowsheet. Outcome: Progressing Towards Goal 
Fall Risk Interventions: 
Mobility Interventions: Utilize walker, cane, or other assistive device, Utilize gait belt for transfers/ambulation Medication Interventions: Evaluate medications/consider consulting pharmacy, Patient to call before getting OOB History of Falls Interventions: Evaluate medications/consider consulting pharmacy

## 2019-02-24 LAB
ANION GAP SERPL CALC-SCNC: 8 MMOL/L (ref 5–15)
BUN SERPL-MCNC: 15 MG/DL (ref 6–20)
BUN/CREAT SERPL: 20 (ref 12–20)
CALCIUM SERPL-MCNC: 8.5 MG/DL (ref 8.5–10.1)
CHLORIDE SERPL-SCNC: 104 MMOL/L (ref 97–108)
CO2 SERPL-SCNC: 28 MMOL/L (ref 21–32)
CREAT SERPL-MCNC: 0.76 MG/DL (ref 0.55–1.02)
GLUCOSE SERPL-MCNC: 90 MG/DL (ref 65–100)
POTASSIUM SERPL-SCNC: 3.4 MMOL/L (ref 3.5–5.1)
SODIUM SERPL-SCNC: 140 MMOL/L (ref 136–145)

## 2019-02-24 PROCEDURE — 74011250637 HC RX REV CODE- 250/637: Performed by: INTERNAL MEDICINE

## 2019-02-24 PROCEDURE — 94760 N-INVAS EAR/PLS OXIMETRY 1: CPT

## 2019-02-24 PROCEDURE — 65270000032 HC RM SEMIPRIVATE

## 2019-02-24 PROCEDURE — 80048 BASIC METABOLIC PNL TOTAL CA: CPT

## 2019-02-24 PROCEDURE — 36415 COLL VENOUS BLD VENIPUNCTURE: CPT

## 2019-02-24 PROCEDURE — 74011000258 HC RX REV CODE- 258: Performed by: INTERNAL MEDICINE

## 2019-02-24 PROCEDURE — 74011250636 HC RX REV CODE- 250/636: Performed by: INTERNAL MEDICINE

## 2019-02-24 RX ORDER — POTASSIUM CHLORIDE 750 MG/1
20 TABLET, FILM COATED, EXTENDED RELEASE ORAL DAILY
Status: DISCONTINUED | OUTPATIENT
Start: 2019-02-25 | End: 2019-02-25 | Stop reason: HOSPADM

## 2019-02-24 RX ADMIN — Medication 10 ML: at 04:34

## 2019-02-24 RX ADMIN — VENLAFAXINE HYDROCHLORIDE 150 MG: 150 CAPSULE, EXTENDED RELEASE ORAL at 16:25

## 2019-02-24 RX ADMIN — VANCOMYCIN HYDROCHLORIDE 1250 MG: 10 INJECTION, POWDER, LYOPHILIZED, FOR SOLUTION INTRAVENOUS at 04:39

## 2019-02-24 RX ADMIN — CEFEPIME HYDROCHLORIDE 2 G: 2 INJECTION, POWDER, FOR SOLUTION INTRAVENOUS at 04:33

## 2019-02-24 RX ADMIN — HYDROCHLOROTHIAZIDE 25 MG: 25 TABLET ORAL at 08:13

## 2019-02-24 RX ADMIN — Medication 10 ML: at 21:16

## 2019-02-24 RX ADMIN — THERA TABS 1 TABLET: TAB at 08:12

## 2019-02-24 RX ADMIN — CYCLOBENZAPRINE 5 MG: 10 TABLET, FILM COATED ORAL at 16:26

## 2019-02-24 RX ADMIN — ATORVASTATIN CALCIUM 10 MG: 20 TABLET, FILM COATED ORAL at 16:27

## 2019-02-24 RX ADMIN — VANCOMYCIN HYDROCHLORIDE 1250 MG: 10 INJECTION, POWDER, LYOPHILIZED, FOR SOLUTION INTRAVENOUS at 16:24

## 2019-02-24 RX ADMIN — CYANOCOBALAMIN TAB 500 MCG 1000 MCG: 500 TAB at 08:13

## 2019-02-24 RX ADMIN — CYCLOBENZAPRINE 5 MG: 10 TABLET, FILM COATED ORAL at 11:22

## 2019-02-24 RX ADMIN — CYCLOBENZAPRINE 5 MG: 10 TABLET, FILM COATED ORAL at 07:42

## 2019-02-24 RX ADMIN — POTASSIUM CHLORIDE 10 MEQ: 750 TABLET, FILM COATED, EXTENDED RELEASE ORAL at 08:13

## 2019-02-24 RX ADMIN — CEFEPIME HYDROCHLORIDE 2 G: 2 INJECTION, POWDER, FOR SOLUTION INTRAVENOUS at 16:24

## 2019-02-24 RX ADMIN — Medication 10 ML: at 11:23

## 2019-02-24 RX ADMIN — OYSTER SHELL CALCIUM WITH VITAMIN D 1 TABLET: 500; 200 TABLET, FILM COATED ORAL at 08:13

## 2019-02-24 NOTE — PROGRESS NOTES
Hospitalist Progress Note Ruben Cline MD 
Answering service: [phone] OR [phone] from in house phone Date of Service:  2019 NAME:  Sil Montanez :  1955 MRN:  [id number] Admission Summary:  
Sil Montanez is a 58 y.o. female with PMH of breast ca s/p lumpectomy and radiation, htn, hld who presents with back pain. Has been having symptoms for the past 2 weeks. Met with PCP and underwent outpt MRI last Thursday found remarkable for L5-S1 vertebral osteomyelitis with epidural abscess and severe bilateral canal foraminal stenosis. Interval history / Subjective:  
Seen for f/u of OM She is denying any complaints today Assessment & Plan: Suspected L5-S1 osteomyelitis with epidural abscess 
- NSGY following, no acute surgical needs, will need o/p FU - now off Vanc/Zosyn, and monitor for SIRS response. - Flexeril scheduled. - Continue serial physical exams  
- PO norco for pain, IV morphine for breakthrough -  IR guided biopsy - Cultures pending - Need recommendations regarding antibtioics prior to discharge, ID recomming if Open biopsy possible per neurosurgery 
-- MRI performed - showing stable abscess, D/W Dr Chinedu Haynes- no plans for open biopsy per neurosurgery. Would need 6 weeks of IV abx. Hypokalemia - replete as needed, continue to monitor HLD - home statin H/o breast cancer DCIS Depression post menopausal  - home effexor Code status: FULL 
DVT prophylaxis: SCD Care Plan discussed with: Patient/Family Disposition: Home w/Family once cleared with ID. patient ambulatory on admission. Hospital Problems  Date Reviewed: 2019 Codes Class Noted POA Epidural abscess ICD-10-CM: G06.2 ICD-9-CM: 324.9  2019 Unknown Review of Systems: A comprehensive review of systems was negative except for that written in the HPI. Vital Signs: Last 24hrs VS reviewed since prior progress note. Most recent are: 
Visit Vitals BP (!) 157/99 Pulse 95 Temp 97.9 °F (36.6 °C) Resp 18 Ht 5' 4.5\" (1.638 m) Wt 79.4 kg (175 lb) SpO2 100% BMI 29.57 kg/m² No intake or output data in the 24 hours ending 02/24/19 1008 Physical Examination:  
 
 
     
Constitutional:  No acute distress, cooperative, pleasant   
ENT:  Oral mucous moist, oropharynx benign. Resp:  CTA bilaterally. No wheezing/rhonchi/rales. No accessory muscle use CV:  Regular rhythm, normal rate, no murmurs, gallops, rubs GI:  Soft, non distended, non tender. normoactive bowel sounds, no hepatosplenomegaly Musculoskeletal:  No edema, warm, 2+ pulses throughout, no spinal tenderness. Neurologic:  Moves all extremities. AAOx3, CN II-XII reviewed Skin:  Good turgor, no rashes or ulcers Data Review: All imaging and laboratory data reviewed. Labs:  
 
No results for input(s): WBC, HGB, HCT, PLT, HGBEXT, HCTEXT, PLTEXT, HGBEXT, HCTEXT, PLTEXT in the last 72 hours. Recent Labs  
  02/24/19 
2630   
K 3.4*  
 CO2 28 BUN 15  
CREA 0.76 GLU 90  
CA 8.5 No results for input(s): SGOT, GPT, ALT, AP, TBIL, TBILI, TP, ALB, GLOB, GGT, AML, LPSE in the last 72 hours. No lab exists for component: AMYP, HLPSE No results for input(s): INR, PTP, APTT in the last 72 hours. No lab exists for component: INREXT, INREXT No results for input(s): FE, TIBC, PSAT, FERR in the last 72 hours. No results found for: FOL, RBCF No results for input(s): PH, PCO2, PO2 in the last 72 hours. No results for input(s): CPK, CKNDX, TROIQ in the last 72 hours. No lab exists for component: CPKMB No results found for: CHOL, CHOLX, CHLST, CHOLV, HDL, LDL, LDLC, DLDLP, TGLX, TRIGL, TRIGP, CHHD, CHHDX No results found for: Lemon Lie No results found for: COLOR, APPRN, SPGRU, REFSG, ALEXA, PROTU, GLUCU, KETU, Mary Lou Mancini, RANDY, LEUKU, GLUKE, EPSU, BACTU, WBCU, RBCU, CASTS, UCRY Medications Reviewed:  
 
Current Facility-Administered Medications Medication Dose Route Frequency  vancomycin trough Sunday 2/24 at 4pm   Other ONCE  
 cefepime (MAXIPIME) 2 g in 0.9% sodium chloride (MBP/ADV) 100 mL  2 g IntraVENous Q12H  Vancomycin - Pharmacy to Dose   Other Rx Dosing/Monitoring  vancomycin (VANCOCIN) 1250 mg in  ml infusion  1,250 mg IntraVENous Q12H  hydroCHLOROthiazide (HYDRODIURIL) tablet 25 mg  25 mg Oral DAILY  venlafaxine-SR (EFFEXOR-XR) capsule 150 mg  150 mg Oral QPM  
 calcium-vitamin D (OS-MICHAEL) 500 mg-200 unit tablet  1 Tab Oral DAILY  potassium chloride SR (KLOR-CON 10) tablet 10 mEq  10 mEq Oral DAILY  atorvastatin (LIPITOR) tablet 10 mg  10 mg Oral QPM  
 therapeutic multivitamin (THERAGRAN) tablet 1 Tab  1 Tab Oral DAILY  cyclobenzaprine (FLEXERIL) tablet 5 mg  5 mg Oral TID WITH MEALS  
 acetaminophen (TYLENOL) tablet 1,000 mg  1,000 mg Oral Q6H PRN  
 cyanocobalamin (VITAMIN B12) tablet 1,000 mcg  1,000 mcg Oral DAILY  sodium chloride (NS) flush 5-40 mL  5-40 mL IntraVENous Q8H  
 sodium chloride (NS) flush 5-40 mL  5-40 mL IntraVENous PRN  
 HYDROcodone-acetaminophen (NORCO) 5-325 mg per tablet 1 Tab  1 Tab Oral Q4H PRN  
 morphine injection 2 mg  2 mg IntraVENous Q4H PRN  
 ondansetron (ZOFRAN) injection 4 mg  4 mg IntraVENous Q4H PRN  
 
______________________________________________________________________ EXPECTED LENGTH OF STAY: 6d 2h 
ACTUAL LENGTH OF STAY:          13 Lorella Gilford, MD

## 2019-02-25 VITALS
HEIGHT: 65 IN | TEMPERATURE: 98.4 F | WEIGHT: 175 LBS | RESPIRATION RATE: 17 BRPM | OXYGEN SATURATION: 96 % | DIASTOLIC BLOOD PRESSURE: 97 MMHG | BODY MASS INDEX: 29.16 KG/M2 | SYSTOLIC BLOOD PRESSURE: 149 MMHG | HEART RATE: 97 BPM

## 2019-02-25 LAB
ANION GAP SERPL CALC-SCNC: 9 MMOL/L (ref 5–15)
BACTERIA SPEC CULT: NORMAL
BUN SERPL-MCNC: 15 MG/DL (ref 6–20)
BUN/CREAT SERPL: 19 (ref 12–20)
CALCIUM SERPL-MCNC: 8.4 MG/DL (ref 8.5–10.1)
CHLORIDE SERPL-SCNC: 104 MMOL/L (ref 97–108)
CO2 SERPL-SCNC: 28 MMOL/L (ref 21–32)
CREAT SERPL-MCNC: 0.78 MG/DL (ref 0.55–1.02)
DATE LAST DOSE: ABNORMAL
GLUCOSE SERPL-MCNC: 95 MG/DL (ref 65–100)
GRAM STN SPEC: NORMAL
POTASSIUM SERPL-SCNC: 3.4 MMOL/L (ref 3.5–5.1)
REPORTED DOSE,DOSE: ABNORMAL UNITS
REPORTED DOSE/TIME,TMG: ABNORMAL
SERVICE CMNT-IMP: NORMAL
SERVICE CMNT-IMP: NORMAL
SODIUM SERPL-SCNC: 141 MMOL/L (ref 136–145)
VANCOMYCIN TROUGH SERPL-MCNC: 16.8 UG/ML (ref 5–10)

## 2019-02-25 PROCEDURE — 74011250637 HC RX REV CODE- 250/637: Performed by: INTERNAL MEDICINE

## 2019-02-25 PROCEDURE — 36415 COLL VENOUS BLD VENIPUNCTURE: CPT

## 2019-02-25 PROCEDURE — 74011250636 HC RX REV CODE- 250/636: Performed by: INTERNAL MEDICINE

## 2019-02-25 PROCEDURE — 74011000258 HC RX REV CODE- 258: Performed by: INTERNAL MEDICINE

## 2019-02-25 PROCEDURE — 80048 BASIC METABOLIC PNL TOTAL CA: CPT

## 2019-02-25 PROCEDURE — 80202 ASSAY OF VANCOMYCIN: CPT

## 2019-02-25 PROCEDURE — 74011250637 HC RX REV CODE- 250/637: Performed by: HOSPITALIST

## 2019-02-25 RX ORDER — POTASSIUM CHLORIDE 1500 MG/1
20 TABLET, FILM COATED, EXTENDED RELEASE ORAL DAILY
Qty: 10 TAB | Refills: 0 | Status: SHIPPED | OUTPATIENT
Start: 2019-02-26

## 2019-02-25 RX ADMIN — THERA TABS 1 TABLET: TAB at 08:35

## 2019-02-25 RX ADMIN — CYCLOBENZAPRINE 5 MG: 10 TABLET, FILM COATED ORAL at 12:29

## 2019-02-25 RX ADMIN — POTASSIUM CHLORIDE 20 MEQ: 750 TABLET, EXTENDED RELEASE ORAL at 08:34

## 2019-02-25 RX ADMIN — VANCOMYCIN HYDROCHLORIDE 1250 MG: 10 INJECTION, POWDER, LYOPHILIZED, FOR SOLUTION INTRAVENOUS at 14:32

## 2019-02-25 RX ADMIN — VANCOMYCIN HYDROCHLORIDE 1250 MG: 10 INJECTION, POWDER, LYOPHILIZED, FOR SOLUTION INTRAVENOUS at 03:42

## 2019-02-25 RX ADMIN — CEFEPIME HYDROCHLORIDE 2 G: 2 INJECTION, POWDER, FOR SOLUTION INTRAVENOUS at 03:42

## 2019-02-25 RX ADMIN — CYANOCOBALAMIN TAB 500 MCG 1000 MCG: 500 TAB at 08:34

## 2019-02-25 RX ADMIN — HYDROCHLOROTHIAZIDE 25 MG: 25 TABLET ORAL at 08:34

## 2019-02-25 RX ADMIN — Medication 10 ML: at 13:24

## 2019-02-25 RX ADMIN — CYCLOBENZAPRINE 5 MG: 10 TABLET, FILM COATED ORAL at 16:48

## 2019-02-25 RX ADMIN — OYSTER SHELL CALCIUM WITH VITAMIN D 1 TABLET: 500; 200 TABLET, FILM COATED ORAL at 08:35

## 2019-02-25 RX ADMIN — CEFEPIME HYDROCHLORIDE 2 G: 2 INJECTION, POWDER, FOR SOLUTION INTRAVENOUS at 13:20

## 2019-02-25 RX ADMIN — Medication 10 ML: at 03:43

## 2019-02-25 RX ADMIN — CYCLOBENZAPRINE 5 MG: 10 TABLET, FILM COATED ORAL at 07:22

## 2019-02-25 NOTE — PROGRESS NOTES
Participated in IDR rounds this morning. Patient will need approx 6 weeks of home antibiotics- Vancomycin and Cefepime. Referral sent to [facility] (Home antibiotics) and [address] [facility] Awaiting home antibiotics orders prior to discharge. Dr. Alysha Holt was been paged to request orders. Notified patient of discharge plan. CM will continue to follow. MELANIE Fischer,WHIT Addendum 12:00PM  
Received home antibiotics orders. Patient to receive antibiotics- Cefepime now and then Vancomycin at 2PM at the earliest. Sent updated orders and PICC Line report to [facility] via [employer]. Spoke with [facility]. and [facility] via phone to confirm discharge plan. Patient is in agreement with plan. Patient's family will transport. No further case management needs. CM will continue to follow. MELANIE Fischer CRM

## 2019-02-25 NOTE — DISCHARGE SUMMARY
Discharge Summary       PATIENT ID: Arline Doll  MRN: [id number]   YOB: 1955    DATE OF ADMISSION: 2/11/2019  3:07 PM    DATE OF DISCHARGE: 02/25/19  PRIMARY CARE PROVIDER: Arlette Pisano MD       DISCHARGING PROVIDER: Anirudh Contreras MD    To contact this individual call [phone] and ask the  to page. If unavailable ask to be transferred the Adult Hospitalist Department. CONSULTATIONS: IP CONSULT TO NEUROSURGERY  IP CONSULT TO INFECTIOUS DISEASES  IP CONSULT TO INTERVENTIONAL RADIOLOGY      PROCEDURES/SURGERIES: * No surgery found *    IMAGING  Mri Lumb Spine W Wo Cont    Result Date: 2/22/2019  IMPRESSION: 1. Continued discitis osteomyelitis at L5-S1. The small epidural abscess has mildly decreased in the interval with less mass effect on the thecal sac. Unchanged phlegmonous changes are seen in the paravertebral soft tissues, but do not extend into the psoas musculature. Moderate spinal stenosis remains with severe bilateral neural foraminal narrowing. 2. Unchanged significant spondylosis at L3-4 and L4-5 as above. Mri Lumb Spine W Wo Cont    Result Date: 2/13/2019  IMPRESSION: 1. L5-S1 vertebral osteomyelitis with slightly enlarged epidural abscess and L5-S1 severe canal and bilateral foraminal stenosis. 2. Additional multilevel canal and foraminal stenoses at L3-4 and L4-5 as described above. Ir Bx Bone Deep    Result Date: 2/18/2019  IMPRESSION: Successful L5-S1 disc biopsy and aspiration           ADMITTING DIAGNOSES  Per admitting  Arline Doll is a 58 y.o. female with PMH of breast ca s/p lumpectomy and radiation, htn, hld who presents with back pain. Has been having symptoms for the past 2 weeks. Meet with PCP and underwent outpt MRI last Thursday found remarkable for L5-S1 vertebral osteomyelitis with epidural abscess and severe bilateral canal foraminal stenosis.   Pain in lumbar back without radiation. Exacerbated with movement. Denies fever, chills, focal motor or sensory deficits. Otherwise denies acute complaints at this time. HOSPITAL COURSE:   Suspected L5-S1 osteomyelitis with epidural abscess  -was on  Vanc/Zosyn on admission - this was d/c and aspiration done - cultures so far negative - could be masked by the fact she was on abx prior   -  IR guided biopsy 2/18  -2/22- Recheck  MRI performed - showing stable abscess, D/W Dr Burgess Castillo- no plans for open biopsy per neurosurgery. Would need 6 weeks of IV abx.   -PICC line placed> CM to setup OP ABX   - NSGY following, no acute surgical needs, will need o/p FU with them      Hypokalemia - repleted     HLD - home statin  H/o breast cancer DCIS  Depression post menopausal  - home effexor    Home ABX for D/C  Needs Followup with Neurosurgery and ID         DISCHARGE DIAGNOSES / PLAN:      Patient Active Problem List   Diagnosis Code    HTN (hypertension) I10    Hypercholesterolemia E78.00    Hypercholesterolemia E78.00    DCIS (ductal carcinoma in situ) D05.10    Epidural abscess G06.2               FOLLOW UP APPOINTMENTS:    Follow-up Information     Follow up With Specialties Details Why Contact Info    Olga Barahona MD Internal Medicine Go on 3/4/2019 For hospital follow up appointment at 3:00PM  [address]      Katlin Jason MD Infectious Diseases In 2 weeks For wound re-check, Follow Up [address]  [address] [place] [address]      Virginia Quijano MD Neurosurgery In 2 weeks Follow Up, For wound re-check [address] [facility]  [phone]             ADDITIONAL CARE RECOMMENDATIONS:    · It is important that you take the medication exactly as they are prescribed. · Keep your medication in the bottles provided by the pharmacist and keep a list of the medication names, dosages, and times to be taken in your wallet. · Do not take other medications without consulting your doctor. · No drinking alcohol or driving car or operating machinery if you are on narcotic pain medications. Donot take sedating mediations if you are sleepy or confused. · Fall Precautions  · Keep Well Hydrated  · Report to your medical provider if you feel you have  developed allergies to medications  · Follow up with your PCP or Consultant for medication adjustments and refills  · Monitor for signs of fevers,chills,bleeding,chest pain and seek medical attention if you do so. Home IV antibiotics  1. Routine PICC care      2. Antibiotic:   Vancomycin 1250 mg IV q 12 hours                                      PLUS                          Cefepime 2 GM IV q 12 hours     3.  Lab each Monday:              CBC/diff/platelets              CMP              ESR              CRP              Trough Vancomycin level     4.  Lab each Thursday:              CBC/diff/platelets              CMP              Trough Vancomycin level     5. Fax lab to Dr. Elmo Carter @ [phone]     6.  Call Dr. Elmo Carter @ [phone] for WBC <3500, creatinine > 1.2, or platelets < 373,151     7. Duration of therapy: Not yet determined; possibly 6 weeks              Please call Dr. Elmo Carter @ [phone] before stopping therapy.     8. Allergies:  No Known Allergies      9. Pharmacy Consult for Vancomycin dosing -- aim for trough 15-18     10. Appt Dr. Elmo Carter in 1-2 weeks             DIET: Regular Diet    ACTIVITY: Activity as tolerated          DISCHARGE MEDICATIONS:  Current Discharge Medication List      START taking these medications    Details   HYDROcodone-acetaminophen (NORCO) 5-325 mg per tablet Take 1 Tab by mouth every eight (8) hours as needed. Max Daily Amount: 3 Tabs.   Qty: 15 Tab, Refills: 0    Associated Diagnoses: Epidural abscess         CONTINUE these medications which have CHANGED    Details   potassium chloride SR (K-TAB) 20 mEq tablet Take 1 Tab by mouth daily.  Qty: 10 Tab, Refills: 0         CONTINUE these medications which have NOT CHANGED    Details   atorvastatin (LIPITOR) 10 mg tablet Take 10 mg by mouth every evening. therapeutic multivitamin (THERAGRAN) tablet Take 1 Tab by mouth daily. cyclobenzaprine (FLEXERIL) 10 mg tablet Take 5 mg by mouth three (3) times daily (with meals). acetaminophen (MAPAP EXTRA STRENGTH) 500 mg tablet Take 1,000 mg by mouth every six (6) hours as needed for Pain. cyanocobalamin 1,000 mcg tablet Take 1,000 mcg by mouth daily. venlafaxine-SR (EFFEXOR XR) 150 mg capsule Take 150 mg by mouth every evening. hydrochlorothiazide (HYDRODIURIL) 25 mg tablet Take 25 mg by mouth daily. CALCIUM CARBONATE/VITAMIN D3 (CALTRATE 600 + D PO) Take 1 Tab by mouth daily. STOP taking these medications       doxycycline hyclate (VIBRAMYCIN) 20 mg Cap capsule Comments:   Reason for Stopping:               All Micro Results     Procedure Component Value Units Date/Time    CULTURE, BODY FLUID [place]  STAIN [[id number]] Collected:  02/18/19 1625    Order Status:  Completed Specimen:  Aspirate Updated:  02/25/19 0946     Special Requests: L5 S1 DISC SPACE     GRAM STAIN RARE WBCS SEEN               OCCASIONAL GRAM POSITIVE COCCI IN CLUSTERS                  CORRECTION TO PREVIOUS REPORT NO ORGANISMS SEEN                  CALLED TO AND READ BACK BY Po Norris RN [facility] AT 2004 ON 2/14/2019.  Casi [id number]           Culture result:       NO GROWTH ON SOLID MEDIA AT 4 DAYS                  NO GROWTH IN THIO BROTH AT 7 DAYS          CULTURE, ANAEROBIC [[id number]] Collected:  02/18/19 1625    Order Status:  Completed Specimen:  Biopsy Updated:  02/25/19 0945     Special Requests: L5 S1 DISC SPACE     Culture result:       NO GROWTH ON SOLID MEDIA AT 4 DAYS                  NO GROWTH IN THIO BROTH AT 7 DAYS          CULTURE, FUNGUS [[id number]] Collected:  02/18/19 1625    Order Status:  Completed Specimen:  Biopsy Updated:  02/25/19 7255     Special Requests: NO SPECIAL REQUESTS        Culture result: NO FUNGUS ISOLATED 7 DAYS       AFB CULTURE + SMEAR W/RFLX ID FROM CULTURE [[id number]] Collected:  02/18/19 1625    Order Status:  Completed Specimen:  Miscellaneous sample Updated:  02/20/19 1637     Source BIOPSY        AFB Specimen processing Concentration     Acid Fast Smear NEGATIVE         Comment: (NOTE)  Performed At: [address] [id number]  Joanne Barry MD [id number]          Acid Fast Culture PENDING    CULTURE, ANAEROBIC [[id number]]     Order Status:  Canceled Specimen:  Biopsy     CULTURE, BLOOD, PAIRED [[id number]] Collected:  02/11/19 1813    Order Status:  Completed Specimen:  Blood Updated:  02/16/19 0546     Special Requests: NO SPECIAL REQUESTS        Culture result: NO GROWTH 5 DAYS       CULTURE, BLOOD [[id number]]     Order Status:  Canceled Specimen:  Blood           Recent Results (from the past 24 hour(s))   METABOLIC PANEL, BASIC    Collection Time: 02/25/19  3:26 AM   Result Value Ref Range    Sodium 141 136 - 145 mmol/L    Potassium 3.4 (L) 3.5 - 5.1 mmol/L    Chloride 104 97 - 108 mmol/L    CO2 28 21 - 32 mmol/L    Anion gap 9 5 - 15 mmol/L    Glucose 95 65 - 100 mg/dL    BUN 15 6 - 20 MG/DL    Creatinine 0.78 0.55 - 1.02 MG/DL    BUN/Creatinine ratio 19 12 - 20      GFR est AA >60 >60 ml/min/1.73m2    GFR est non-AA >60 >60 ml/min/1.73m2    Calcium 8.4 (L) 8.5 - 10.1 MG/DL   VANCOMYCIN, TROUGH    Collection Time: 02/25/19  3:26 AM   Result Value Ref Range    Vancomycin,trough 16.8 (H) 5.0 - 10.0 ug/mL    Reported dose date: NOT PROVIDED      Reported dose time: NOT PROVIDED      Reported dose: NOT PROVIDED UNITS           NOTIFY YOUR PHYSICIAN FOR ANY OF THE FOLLOWING:   Fever over 101 degrees for 24 hours. Chest pain, shortness of breath, fever, chills, nausea, vomiting, diarrhea, change in mentation, falling, weakness, bleeding.  Severe pain or pain not relieved by medications. Or, any other signs or symptoms that you may have questions about. DISPOSITION:    Home With:   OT  PT  HH  RN       Long term SNF/Inpatient Rehab   X Independent/assisted living    Hospice    Other:       PATIENT CONDITION AT DISCHARGE:     Functional status    Poor     Deconditioned    X Independent      Cognition   X  Lucid     Forgetful     Dementia      Catheters/lines (plus indication)    Kilpatrick    X PICC     PEG     None      Code status   X  Full code     DNR      PHYSICAL EXAMINATION AT DISCHARGE:  Gen:  No distress, alert  HEENT:  Normal cephalic atraumatic, extra-occular movements are intact. Neck:  Supple, No JVD  Lungs:  Clear bilaterally, no wheeze, no rales, normal effort  Heart:  Regular Rate and Rhythm, normal S1 and S2, no edema  Abdomen:  Soft, non tender, normal bowel sounds, no guarding. Extremities:  Well perfused, no cyanosis or edema  Neurological:  Awake and alert, CN's are intact, normal strength throughout extremities  Skin:  No rashes or moles  Mental Status:  Normal thought process, does not appear anxious      CHRONIC MEDICAL DIAGNOSES:  Problem List as of 2/25/2019 Date Reviewed: 2/12/2019          Codes Class Noted - Resolved    Epidural abscess ICD-10-CM: G06.2  ICD-9-CM: 324.9  2/11/2019 - Present        HTN (hypertension) ICD-10-CM: I10  ICD-9-CM: 401.9  Unknown - Present        Hypercholesterolemia ICD-10-CM: E78.00  ICD-9-CM: 272.0  Unknown - Present        Hypercholesterolemia ICD-10-CM: E78.00  ICD-9-CM: 272.0  Unknown - Present        DCIS (ductal carcinoma in situ) ICD-10-CM: D05.10  ICD-9-CM: 233.0  8/4/2011 - Present                Discussed with patient and family. Explained the importance of following up, Compliance with medications and recommendations on discharge,Side effect profile of medications were explained. Safety precautions at home and while taking pain medications also explained.  All questions answered to the satisfaction of the patient/family. Discussed with consultant(s) who are agreeable to the discharge. Verbal and written instructions on discharge given. Explained about Discharge medications and side effect profile. Advised patient/family to followup with their pcp for medication refills and preauthorization of medications, Home health orders. checkups,screenign programs as appropriate for age.        Thank you Jazz Peres MD for taking care of your patient, Please call with any questions.       Greater than 36 minutes were spent with the patient on counseling and coordination of care    Signed:   Gabbie Duncan MD  2/25/2019  12:39 PM

## 2019-02-25 NOTE — PROGRESS NOTES
[address] [address] [facility] from pharmacy stated that the pt can receive cefepime now and then vancomycin at 1400 at the earliest.

## 2019-02-25 NOTE — DISCHARGE INSTRUCTIONS
· It is important that you take the medication exactly as they are prescribed. · Keep your medication in the bottles provided by the pharmacist and keep a list of the medication names, dosages, and times to be taken in your wallet. · Do not take other medications without consulting your doctor. · No drinking alcohol or driving car or operating machinery if you are on narcotic pain medications. Donot take sedating mediations if you are sleepy or confused. · Fall Precautions  · Keep Well Hydrated  · Report to your medical provider if you feel you have  developed allergies to medications  · Follow up with your PCP or Consultant for medication adjustments and refills  · Monitor for signs of fevers,chills,bleeding,chest pain and seek medical attention if you do so. Home IV antibiotics  1. Routine PICC care      2. Antibiotic:   Vancomycin 1250 mg IV q 12 hours                                      PLUS                          Cefepime 2 GM IV q 12 hours     3.  Lab each Monday:              CBC/diff/platelets              CMP              ESR              CRP              Trough Vancomycin level     4.  Lab each Thursday:              CBC/diff/platelets              CMP              Trough Vancomycin level     5. Fax lab to Dr. Moira Uriarte @ [phone]     6.  Call Dr. Moira Uriarte @ [phone] for WBC <3500, creatinine > 1.2, or platelets < [phone],414     7. Duration of therapy: Not yet determined; possibly 6 weeks              Please call Dr. Moira Uriarte @ [phone] before stopping therapy.     8. Allergies:  No Known Allergies      9. Pharmacy Consult for Vancomycin dosing -- aim for trough 15-18     10.  Appt Dr. Moira Uriarte in 1-2 weeks

## 2019-02-25 NOTE — PROGRESS NOTES
Pharmacist Note - Vancomycin Dosing  (previously on vancomycin from 2/11 - 2/14) Therapy day 4 Indication:  Presumed L5 - S1 discitis, vertebral osteomyelitis, and epidural abscess Current regimen: 1250 mg q12h A Trough Level resulted at 16.8 mcg/mL which was obtained 11 hrs post-dose. The extrapolated \"true\" trough is approximately 15.5 mcg/mL based on the patient's known kinetics. Goal trough: 15 - 20 mcg/mL Plan: Continue current regimen. Pharmacy will continue to monitor this patient daily for changes in clinical status and renal function.

## 2019-02-25 NOTE — PROGRESS NOTES
[facility] 1. Diagnosis:  L5-S1 vetebral discitis and osteomyelitis with associated epidural abscess -- etiologic microorganism not determined 2. Routine PICC care 3. Antibiotic: Vancomycin 1250 mg IV q 12 hours PLUS Cefepime 2 GM IV q 12 hours 4. Lab each Monday: CBC/diff/platelets CMP 
 ESR 
 CRP Trough Vancomycin level 5. Lab each Thursday: CBC/diff/platelets CMP Trough Vancomycin level 6. Fax lab to Dr. Jamia Solo @ [phone] 7. Call Dr. Jamia Solo @ [phone] for WBC <3500, creatinine > 1.2, or platelets < [phone] 8. Duration of therapy: Not yet determined; possibly 6 weeks Please call Dr. Jamia Solo @ [phone] before stopping therapy. 9.  Allergies:  No Known Allergies 10. Pharmacy Consult for Vancomycin dosing -- aim for trough 15-18 11. Appt Dr. Jamia Solo in 1-2 weeks Ciera Pittman MD 
PHONE [phone]

## 2019-03-18 LAB
BACTERIA SPEC CULT: NORMAL
SERVICE CMNT-IMP: NORMAL

## 2019-04-03 LAB
ACID FAST STN SPEC: NEGATIVE
MYCOBACTERIUM SPEC QL CULT: NEGATIVE
SPECIMEN PREPARATION: NORMAL
SPECIMEN SOURCE: NORMAL

## 2019-04-22 ENCOUNTER — HOSPITAL ENCOUNTER (OUTPATIENT)
Dept: MRI IMAGING | Age: 64
Discharge: HOME | End: 2019-04-22
Attending: INTERNAL MEDICINE
Payer: MEDICARE

## 2019-04-22 DIAGNOSIS — M46.26 OSTEOMYELITIS OF VERTEBRA OF LUMBAR REGION (HCC): ICD-10-CM

## 2019-04-22 PROCEDURE — 72158 MRI LUMBAR SPINE W/O & W/DYE: CPT

## 2019-04-22 PROCEDURE — A9575 INJ GADOTERATE MEGLUMI 0.1ML: HCPCS | Performed by: RADIOLOGY

## 2019-04-22 RX ORDER — SODIUM CHLORIDE 0.9 % (FLUSH) 0.9 %
10 SYRINGE (ML) INJECTION
Status: COMPLETED | OUTPATIENT
Start: 2019-04-22 | End: 2019-04-22

## 2019-04-22 RX ORDER — GADOTERATE MEGLUMINE 376.9 MG/ML
16 INJECTION INTRAVENOUS
Status: COMPLETED | OUTPATIENT
Start: 2019-04-22 | End: 2019-04-22

## 2019-04-22 RX ADMIN — GADOTERATE MEGLUMINE 16 ML: 376.9 INJECTION INTRAVENOUS at 13:00

## 2019-04-22 RX ADMIN — Medication 10 ML: at 13:00

## 2021-03-10 ENCOUNTER — TRANSCRIBE ORDER (OUTPATIENT)
Dept: SCHEDULING | Age: 66
End: 2021-03-10

## 2021-03-10 DIAGNOSIS — R93.89 ABNORMAL FINDING ON IMAGING: ICD-10-CM

## 2021-03-10 DIAGNOSIS — R93.89 ABNORMAL RADIOLOGICAL FINDINGS IN SKIN AND SUBCUTANEOUS TISSUE: Primary | ICD-10-CM

## 2021-03-16 ENCOUNTER — HOSPITAL ENCOUNTER (OUTPATIENT)
Dept: NUCLEAR MEDICINE | Age: 66
Discharge: HOME | End: 2021-03-16
Attending: INTERNAL MEDICINE
Payer: MEDICARE

## 2021-03-16 DIAGNOSIS — R93.89 ABNORMAL FINDING ON IMAGING: ICD-10-CM

## 2021-03-16 PROCEDURE — 78306 BONE IMAGING WHOLE BODY: CPT

## 2021-03-30 ENCOUNTER — TRANSCRIBE ORDER (OUTPATIENT)
Dept: SCHEDULING | Age: 66
End: 2021-03-30

## 2021-03-30 DIAGNOSIS — R93.89 ABNORMAL RADIOLOGICAL FINDINGS IN SKIN AND SUBCUTANEOUS TISSUE: Primary | ICD-10-CM

## 2021-03-30 DIAGNOSIS — Z85.3 PERSONAL HISTORY OF MALIGNANT NEOPLASM OF BREAST: ICD-10-CM

## 2021-03-30 DIAGNOSIS — M46.46 DISCITIS OF LUMBAR REGION: ICD-10-CM

## 2021-04-07 ENCOUNTER — HOSPITAL ENCOUNTER (OUTPATIENT)
Dept: MRI IMAGING | Age: 66
Discharge: HOME | End: 2021-04-07
Payer: MEDICARE

## 2021-04-07 DIAGNOSIS — R93.89 ABNORMAL RADIOLOGICAL FINDINGS IN SKIN AND SUBCUTANEOUS TISSUE: ICD-10-CM

## 2021-04-07 DIAGNOSIS — M46.46 DISCITIS OF LUMBAR REGION: ICD-10-CM

## 2021-04-07 DIAGNOSIS — Z85.3 PERSONAL HISTORY OF MALIGNANT NEOPLASM OF BREAST: ICD-10-CM

## 2021-04-07 PROCEDURE — 72158 MRI LUMBAR SPINE W/O & W/DYE: CPT | Performed by: INTERNAL MEDICINE

## 2021-04-07 RX ORDER — GADOTERATE MEGLUMINE 376.9 MG/ML
18 INJECTION INTRAVENOUS
Status: COMPLETED | OUTPATIENT
Start: 2021-04-07 | End: 2021-04-07

## 2021-04-07 RX ADMIN — GADOTERATE MEGLUMINE 18 ML: 376.9 INJECTION INTRAVENOUS at 13:00

## 2021-04-15 ENCOUNTER — HOSPITAL ENCOUNTER (OUTPATIENT)
Dept: MRI IMAGING | Age: 66
Discharge: HOME | End: 2021-04-15
Payer: MEDICARE

## 2021-04-15 DIAGNOSIS — M46.46 DISCITIS OF LUMBAR REGION: ICD-10-CM

## 2021-04-15 DIAGNOSIS — R93.89 ABNORMAL RADIOLOGICAL FINDINGS IN SKIN AND SUBCUTANEOUS TISSUE: ICD-10-CM

## 2021-04-15 DIAGNOSIS — Z85.3 PERSONAL HISTORY OF MALIGNANT NEOPLASM OF BREAST: ICD-10-CM

## 2021-04-15 PROCEDURE — 72157 MRI CHEST SPINE W/O & W/DYE: CPT | Performed by: INTERNAL MEDICINE

## 2021-04-15 RX ORDER — GADOTERATE MEGLUMINE 376.9 MG/ML
18 INJECTION INTRAVENOUS
Status: COMPLETED | OUTPATIENT
Start: 2021-04-15 | End: 2021-04-15

## 2021-04-15 RX ADMIN — GADOTERATE MEGLUMINE 18 ML: 376.9 INJECTION INTRAVENOUS at 13:00

## 2022-03-18 PROBLEM — G06.2 EPIDURAL ABSCESS: Status: ACTIVE | Noted: 2019-02-11

## 2022-09-17 NOTE — PROGRESS NOTES
Participated in IDR rounds this morning. Consult IR for possible biopsy on today. Patient is likely to discharge home when medically cleared. CM will continue to follow. Kwabena Vargas, MSW,CRM [Annual] : an annual visit.

## 2023-05-11 RX ORDER — HYDROCHLOROTHIAZIDE 25 MG/1
25 TABLET ORAL DAILY
COMMUNITY

## 2023-05-11 RX ORDER — POTASSIUM CHLORIDE 20 MEQ/1
TABLET, EXTENDED RELEASE ORAL DAILY
COMMUNITY
Start: 2019-02-26

## 2023-05-11 RX ORDER — ATORVASTATIN CALCIUM 10 MG/1
TABLET, FILM COATED ORAL EVERY EVENING
COMMUNITY

## 2023-05-11 RX ORDER — CYCLOBENZAPRINE HCL 10 MG
TABLET ORAL
COMMUNITY

## 2023-05-11 RX ORDER — ACETAMINOPHEN 500 MG
TABLET ORAL EVERY 6 HOURS PRN
COMMUNITY

## 2023-05-11 RX ORDER — VENLAFAXINE HYDROCHLORIDE 150 MG/1
CAPSULE, EXTENDED RELEASE ORAL EVERY EVENING
COMMUNITY

## 2023-05-11 RX ORDER — HYDROCODONE BITARTRATE AND ACETAMINOPHEN 5; 325 MG/1; MG/1
1 TABLET ORAL EVERY 8 HOURS PRN
COMMUNITY
Start: 2019-02-22